# Patient Record
Sex: FEMALE | Race: WHITE | NOT HISPANIC OR LATINO | Employment: OTHER | URBAN - NONMETROPOLITAN AREA
[De-identification: names, ages, dates, MRNs, and addresses within clinical notes are randomized per-mention and may not be internally consistent; named-entity substitution may affect disease eponyms.]

---

## 2017-01-05 ENCOUNTER — COMMUNICATION - GICH (OUTPATIENT)
Dept: INTERNAL MEDICINE | Facility: OTHER | Age: 82
End: 2017-01-05

## 2017-01-05 DIAGNOSIS — F34.1 DYSTHYMIC DISORDER: ICD-10-CM

## 2017-02-14 ENCOUNTER — COMMUNICATION - GICH (OUTPATIENT)
Dept: INTERNAL MEDICINE | Facility: OTHER | Age: 82
End: 2017-02-14

## 2017-02-14 DIAGNOSIS — I10 ESSENTIAL (PRIMARY) HYPERTENSION: ICD-10-CM

## 2017-03-14 ENCOUNTER — COMMUNICATION - GICH (OUTPATIENT)
Dept: INTERNAL MEDICINE | Facility: OTHER | Age: 82
End: 2017-03-14

## 2017-03-14 DIAGNOSIS — M80.88XD OTHER OSTEOPOROSIS WITH CURRENT PATHOLOGICAL FRACTURE, VERTEBRA(E), SUBSEQUENT ENCOUNTER FOR FRACTURE WITH ROUTINE HEALING: ICD-10-CM

## 2017-06-12 ENCOUNTER — COMMUNICATION - GICH (OUTPATIENT)
Dept: INTERNAL MEDICINE | Facility: OTHER | Age: 82
End: 2017-06-12

## 2017-06-12 DIAGNOSIS — M80.88XD OTHER OSTEOPOROSIS WITH CURRENT PATHOLOGICAL FRACTURE, VERTEBRA(E), SUBSEQUENT ENCOUNTER FOR FRACTURE WITH ROUTINE HEALING: ICD-10-CM

## 2017-06-16 ENCOUNTER — COMMUNICATION - GICH (OUTPATIENT)
Dept: INTERNAL MEDICINE | Facility: OTHER | Age: 82
End: 2017-06-16

## 2017-06-16 DIAGNOSIS — M80.88XD OTHER OSTEOPOROSIS WITH CURRENT PATHOLOGICAL FRACTURE, VERTEBRA(E), SUBSEQUENT ENCOUNTER FOR FRACTURE WITH ROUTINE HEALING: ICD-10-CM

## 2017-08-06 ENCOUNTER — COMMUNICATION - GICH (OUTPATIENT)
Dept: INTERNAL MEDICINE | Facility: OTHER | Age: 82
End: 2017-08-06

## 2017-08-06 DIAGNOSIS — I48.20 CHRONIC ATRIAL FIBRILLATION (H): ICD-10-CM

## 2017-08-07 ENCOUNTER — COMMUNICATION - GICH (OUTPATIENT)
Dept: INTERNAL MEDICINE | Facility: OTHER | Age: 82
End: 2017-08-07

## 2017-08-07 DIAGNOSIS — I10 ESSENTIAL (PRIMARY) HYPERTENSION: ICD-10-CM

## 2017-08-08 ENCOUNTER — COMMUNICATION - GICH (OUTPATIENT)
Dept: INTERNAL MEDICINE | Facility: OTHER | Age: 82
End: 2017-08-08

## 2017-08-09 ENCOUNTER — OFFICE VISIT - GICH (OUTPATIENT)
Dept: INTERNAL MEDICINE | Facility: OTHER | Age: 82
End: 2017-08-09

## 2017-08-09 ENCOUNTER — HISTORY (OUTPATIENT)
Dept: INTERNAL MEDICINE | Facility: OTHER | Age: 82
End: 2017-08-09

## 2017-08-09 DIAGNOSIS — Z23 ENCOUNTER FOR IMMUNIZATION: ICD-10-CM

## 2017-08-09 DIAGNOSIS — Z86.19 PERSONAL HISTORY OF OTHER INFECTIOUS AND PARASITIC DISEASES: ICD-10-CM

## 2017-08-09 DIAGNOSIS — I10 ESSENTIAL (PRIMARY) HYPERTENSION: ICD-10-CM

## 2017-08-09 DIAGNOSIS — J18.1 LOBAR PNEUMONIA (H): ICD-10-CM

## 2017-08-09 DIAGNOSIS — Z79.01 LONG TERM CURRENT USE OF ANTICOAGULANT: ICD-10-CM

## 2017-08-09 DIAGNOSIS — Z92.29 PERSONAL HISTORY OF OTHER DRUG THERAPY (CODE): ICD-10-CM

## 2017-08-09 DIAGNOSIS — I50.32 CHRONIC DIASTOLIC HEART FAILURE (H): ICD-10-CM

## 2017-08-09 DIAGNOSIS — I27.29 OTHER SECONDARY PULMONARY HYPERTENSION (H): ICD-10-CM

## 2017-08-09 DIAGNOSIS — I48.20 CHRONIC ATRIAL FIBRILLATION (H): ICD-10-CM

## 2017-08-09 DIAGNOSIS — F01.50 VASCULAR DEMENTIA WITHOUT BEHAVIORAL DISTURBANCE (H): ICD-10-CM

## 2017-08-09 ASSESSMENT — ANXIETY QUESTIONNAIRES
7. FEELING AFRAID AS IF SOMETHING AWFUL MIGHT HAPPEN: NOT AT ALL
4. TROUBLE RELAXING: NOT AT ALL
1. FEELING NERVOUS, ANXIOUS, OR ON EDGE: NOT AT ALL
GAD7 TOTAL SCORE: 0
2. NOT BEING ABLE TO STOP OR CONTROL WORRYING: NOT AT ALL
3. WORRYING TOO MUCH ABOUT DIFFERENT THINGS: NOT AT ALL
6. BECOMING EASILY ANNOYED OR IRRITABLE: NOT AT ALL
5. BEING SO RESTLESS THAT IT IS HARD TO SIT STILL: NOT AT ALL

## 2017-08-09 ASSESSMENT — PATIENT HEALTH QUESTIONNAIRE - PHQ9: SUM OF ALL RESPONSES TO PHQ QUESTIONS 1-9: 0

## 2017-10-03 ENCOUNTER — AMBULATORY - GICH (OUTPATIENT)
Dept: FAMILY MEDICINE | Facility: OTHER | Age: 82
End: 2017-10-03

## 2017-10-03 DIAGNOSIS — Z23 ENCOUNTER FOR IMMUNIZATION: ICD-10-CM

## 2017-12-28 NOTE — PROGRESS NOTES
Patient Information     Patient Name MRN Anne Vázquez [id number] Female 10/31/1929      Progress Notes by Adam Villagran MD at 2017 10:00 AM     Author:  Adam Villagran MD Service:  (none) Author Type:  Physician     Filed:  8/10/2017 11:07 PM Encounter Date:  2017 Status:  Signed     :  Adam Villagran MD (Physician)            Nursing Notes:   Yashira Handy  2017 10:43 AM  Signed  Patient presents to the clinic for medication management.    Yashira Handy LPN        2017 10:00 AM    Anne Matos presents to clinic today for:   Chief Complaint    Patient presents with      Medication Management     HPI: Ms. Matos is a 87 y.o. female who presents today for evaluation of above.     (I10) HYPERTENSION  (primary encounter diagnosis)  (Z23) Need for 23-polyvalent pneumococcal polysaccharide vaccine  (F01.50) Vascular dementia without behavioral disturbance  (I50.32) Chronic diastolic heart failure - Moderate - 2012 ECHO - EF 75%  (I27.2) Pulmonary hypertension, moderate to severe - ECHO 2012  (J18.1) Pneumonia of left lower lobe due to infectious organism (HC)  (I48.2) Chronic atrial fibrillation (HC)  (Z79.01) Chronic anticoagulation - Apixaban  (I10) Essential hypertension  (Z86.19) History of shingles  (Z92.29) Personal history of ongoing treatment with alendronate (Fosamax) -- Completed treatment in 2017     Hypertension, currently well controlled.  Tolerating medications.  Needs refills.  Declines labs today.    Pneumococcal vaccination due today.    Vascular dementia, she is brought in by her .  Behaviors have been well-controlled.  He reports no significant new concerning issues other than that  + got shingles - mild case this winter. Doing well from that.     Pulmonary hypertension, previously noted to be moderately severe.  Has been stable.  Breathing is reported to be doing well.    Atrial fibrillation, rate controlled with diltiazem.   She is anticoagulated with apixaban.  She's been tolerating this well.  Needs refills today.    Fosamax therapy,  thinks it has been about 5 years since she started Fosamax.  She has diagnosis of osteoporosis.  Advised that she stopped Fosamax once she runs out of medications in the next couple months.    Ms. Matos's Body mass index is 26.71 kg/(m^2). This is out of the normal range for a 87 y.o. Normal range for ages 18+ is between 18.5 and 24.9. To lose weight we reviewed risks and benefits of appropriate options such as diet, exercise, and medications. Patient's strategy will be  self-directed nutrition plan and self-directed exercise program   BP Readings from Last 1 Encounters:08/09/17 : 122/78  Ms. Hinson blood pressure is out of the normal range for adults. Per JNC-8 guidelines normal adult blood pressure is < 120/80, pre-hypertensive is between 120/80 and 139/89, and hypertension is 140/90 or greater. Risks of hypertension were discussed. Patient's strategy will be weight loss, increased activity and reduced salt intake    Functional Capacity: > 4 METS.   Reports that she can climb a flight of stairs without any chest pain/heaviness or shortness of breath.   Patient reports no current symptoms of fevers, chills, nausea/vomiting.   No cough. No shortness of breath.   No change in bowel/bladder habits. No melena, hematochezia. No Hematuria.   No rashes. No palpitations.  No orthopnea/paroxysmal nocturnal dyspnea   No vision or hearing issues.   No significant mood issues   + Memory problems - are chronic.   No bruising.     MERLYN:  MERLYN-7 ANXIETY SCREENING 6/30/2016 8/9/2017   MERLYN date (doc flow) 6/30/2016 8/9/2017   Nervous, anxious 0 0   Cannot stop worrying 0 0   Worry about different things 0 0   Cannot relax 0 0   Feeling restless 0 0   Easily annoyed/irritated 0 0   Afraid of awful event 0 0   Score 0 0   Severity none none   Some recent data might be hidden         PHQ9:  PHQ Depression  Screening 9/27/2016 8/9/2017   Date of PHQ exam (doc flow) 9/27/2016 8/9/2017   1. Lack of interest/pleasure 0 - Not at all 0 - Not at all   2. Feeling down/depressed 0 - Not at all 0 - Not at all   PHQ-2 TOTAL SCORE 0 0   3. Trouble sleeping 0 - Not at all 0 - Not at all   4. Decreased energy 0 - Not at all 0 - Not at all   5. Appetite change 0 - Not at all 0 - Not at all   6. Feelings of failure 0 - Not at all 0 - Not at all   7. Trouble concentrating 0 - Not at all 0 - Not at all   8. Activity level 0 - Not at all 0 - Not at all   9. Hurting yourself 0 - Not at all 0 - Not at all   PHQ-9 TOTAL SCORE 0 0   PHQ-9 Severity Level none none   Functional Impairment not difficult at all not difficult at all   Some recent data might be hidden          I have personally reviewed the past medical history, past surgical history, medications, allergies, family and social history as listed below, on 8/9/2017.    Patient Active Problem List       Diagnosis  Date Noted     Personal history of ongoing treatment with alendronate (Fosamax) -- Completed treatment in Fall 2017 08/09/2017     History of shingles  08/09/2017     Chronic anticoagulation - Apixaban  06/22/2016     History of peptic ulcer  06/22/2016     Pneumonia of left lower lobe due to infectious organism (HC)  06/11/2016     Neck pain  06/11/2016     Hyponatremia, POA  06/11/2016     Hypomagnesemia, POA  06/11/2016     DDD (degenerative disc disease), cervical  06/11/2016     Osteoporosis, POA  06/11/2016     Chronic atrial fibrillation (HC)  10/16/2015     Chronic diastolic heart failure - Moderate - 7/19/2012 ECHO - EF 75%  10/03/2014     7/19/2012 ECHO Final Impression:   1. Normal left ventricular size with hyperdynamic systolic function. Estimated ejection fraction 75%.   2. Moderate concentric increase in left ventricular wall thickness.   3. Mild to moderate biatrial enlargement.   4. Sclerotic aortic valve with mild aortic insufficiency.   5. Mitral annulus  calcification with trace to mild mitral regurgitation.   6. Mild tricuspid regurgitation.   7. Moderate pulmonary hypertension with estimated pulmonary artery systolic pressure 48 mmHg plus right atrial pressure.   8. Probable moderate left ventricular diastolic dysfunction.   9. Compared to a previous transthoracic echocardiogram report from 08/18/2010, there appears to be minimal change.        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       Hypertension      Osteoporosis      Stress response     Stress-related symptoms/family discord      Past Surgical History:      Procedure  Laterality Date     APPENDECTOMY  04/70     CARDIOVERSION  3/2012    of fibrillation in [place]       CATARACT REMOVAL  06/11, 07/11    bilateral       COLONOSCOPY SCREENING  09/13/04     CRYOTHERAPY OF CERVIX  02/26/70    Cervical conization       CRYOTHERAPY OF CERVIX  03/08/70    Cervical cauterization       HEMORRHOIDECTOMY  1960     TOTAL ABDOMINAL HYSTERECTOMY  04/70     Current Outpatient Prescriptions       Medication  Sig Dispense Refill     apixaban (ELIQUIS) 5 mg tablet Take 1 tablet by mouth 2 times daily. 180 tablet 3     Calcium-Cholecalciferol, D3, (CALCIUM 600 WITH VITAMIN D3) 600 mg(1,500mg) -400 unit cap Take  by mouth once daily.  0     cyanocobalamin (VITAMIN B-12) 1,000 mcg tablet Take 1 tablet by mouth once daily.  0     diltiazem CD (CARDIZEM CD) 120 mg extended release 24 hr capsule Take 1  capsule by mouth once daily. 90 capsule 3     lisinopril (PRINIVIL; ZESTRIL) 5 mg tablet Take 1 tablet by mouth once daily. 90 tablet 3     multivitamin (MVI) tablet Take 1 tablet by mouth once daily.  0     No Known Allergies  Family History       Problem   Relation Age of Onset     Cancer  Father      liver       Stroke  Mother      Cancer  Sister      pancreatic       Cancer  Brother      lung       Stroke  Other      stroke       Other  Brother       in the war       Other  Daughter      MS       Other  Other      10 total, 1 still alive       Family Status     Relation  Status     Mother  at age 74    stroke      Father  at age 62    liver cancer      Brother     lung cancer      Sister     pancreatic cancer      Brother      in the war      Other     sibling  of stroke      Son     head and spine injury,  in [place]      Daughter  at age 40    step daughter, thyroid cancer      Daughter Alive    MS, dependent      Other Alive    sibling living      Social History     Social History        Marital status:       Spouse name: N/A     Number of children:  N/A     Years of education:  N/A     Social History Main Topics          Smoking status:   Former Smoker      Packs/day:  0.50      Years:  20.00      Types:  Cigarettes      Quit date:  1981      Smokeless tobacco:   Never Used      Alcohol use   0.0 oz/week     0 Standard drinks or equivalent per week        Comment: 3-4 times a week drinks 3 beers       Drug use:   No      Sexual activity:   Not on file      Other Topics  Concern     Not on file      Social History Narrative     , live in [facility] in [place].         Complete ROS was performed and was negative unless otherwise noted in HPI above.  --  is with and provides much of the history today.     EXAM:   Vitals:     17 1003   BP: 122/78   Pulse: 96   Weight: 64.1 kg (141 lb 6 oz)  "  Height: 1.549 m (5' 1\")     BP Readings from Last 3 Encounters:    08/09/17 122/78   09/27/16 134/76   09/23/16 120/76     Wt Readings from Last 3 Encounters:    08/09/17 64.1 kg (141 lb 6 oz)   09/27/16 63.6 kg (140 lb 4 oz)   09/23/16 63.6 kg (140 lb 3.2 oz)     Estimated body mass index is 26.71 kg/(m^2) as calculated from the following:    Height as of this encounter: 1.549 m (5' 1\").    Weight as of this encounter: 64.1 kg (141 lb 6 oz).     EXAM:  Constitutional: Pleasant, alert, appropriate appearance for age. No acute distress  ENT: Normocephalic, Atraumatic, Thyroid without nodules or tenderness   Nose/Mouth: Oral pharynx without erythema or exudates, Nose is patent bilaterally, no rhinorrhea and Dental hygeine adequate   Eyes:  Extraocular muscles intact, Sclera non-icteric, Conjunctiva without erythema  Lymphatic Exam: Non-palpable nodes in neck, clavicular regions  Pulmonary: Lungs are clear to auscultation bilaterally, without wheezes or crackles  Cardiovascular Exam: regular rate and rhythm, brisk carotid upstroke without bruits, no pedal edema  Gastrointestinal Exam: Soft, non-tender, non-distended, positive bowel sounds  Integument: No abnormal rashes, sores, or ulcerations noted  Neurologic Exam: CN 3-12 grossly intact   Musculoskeletal Exam: Moves upper and lower extremities symmetrically, No focal weakness  Gait and station appear grossly normal  Psychiatric Exam: Awake and Alert, Affect and mood appropriate  Speech is fluent, Thought process is normal    INVESTIGATIONS:  Results for orders placed or performed in visit on 06/23/16      H PYLORI ANTIGEN,STOOL      Result  Value Ref Range    H.PYLORI AGN STOOL Negative Negative   CLOSTRIDIUM DIFFICILE TOXIN PCR      Result  Value Ref Range    TOXIGENIC CLOSTRIDIUM DIFFICILE PCR AFF Negative Negative    PRESUMPTIVE NAP1 STRAIN AFF Negative Negative       ASSESSMENT AND PLAN:  Anne was seen today for medication management.    Diagnoses and all " orders for this visit:    HYPERTENSION  -     lisinopril (PRINIVIL; ZESTRIL) 5 mg tablet; Take 1 tablet by mouth once daily.    Need for 23-polyvalent pneumococcal polysaccharide vaccine  -     OMNI PNEUMOCOCCAL VACCINE 23-VALENT => 3 YO IM  -     DE ADMIN PNEUMOCOCCAL VACC (MEDICARE)    Vascular dementia without behavioral disturbance    Chronic diastolic heart failure - Moderate - 7/19/2012 ECHO - EF 75%    Pulmonary hypertension, moderate to severe - ECHO 7/19/2012    Pneumonia of left lower lobe due to infectious organism (HC)    Chronic atrial fibrillation (HC)  -     apixaban (ELIQUIS) 5 mg tablet; Take 1 tablet by mouth 2 times daily.  -     diltiazem CD (CARDIZEM CD) 120 mg extended release 24 hr capsule; Take 1 capsule by mouth once daily.    Chronic anticoagulation - Apixaban    Essential hypertension  -     lisinopril (PRINIVIL; ZESTRIL) 5 mg tablet; Take 1 tablet by mouth once daily.    History of shingles    Personal history of ongoing treatment with alendronate (Fosamax) -- Completed treatment in Fall 2017    lab results and schedule of future lab studies reviewed with patient, reviewed diet, exercise and weight control, recommended sodium restriction, cardiovascular risk and specific lipid/LDL goals reviewed    -- Expected clinical course discussed   -- Medications and their side effects discussed    The ASCVD Risk score (Claude DC Jr, et al., 2013) failed to calculate for the following reasons:    The 2013 ASCVD risk score is only valid for ages 40 to 79    The patient has a prior MCI or stroke diagnosis    Return in about 1 year (around 8/9/2018) for -- annual follow-up.    Patient Instructions     Immunization History     Administered  Date(s) Administered     Influenza Virus, Unspecified 11/09/2006, 10/19/2007, 09/16/2009, 10/27/2010, 09/04/2012, 11/01/2013     Influenza, High-dose Inactivated 10/02/2014     Influenza, IIV3 (Age >=3 years) 11/02/2008     Influenza, IIV4 (Age >= 3 Years) 09/27/2016      Pneumococcal conj 13-Valent (Prevnar 13) 09/27/2016     Tdap 08/06/2013        Pneumococcal PCV 23 shot today.   Get shingles shot if you would like -- from the pharmacy.       Pneumococcal Pneumonia vaccines (PCV 13 and PCV 23)     Pneumococcal Conjugate 13 - Valent Vaccine (One time only).     Pneumococcal 23 - Valent Vaccine -- Two doses (One before age 65 and One After)    -- repeat every 5 years in certain patient populations.     PCV 13 should be given prior to PCV 23 -- THEN -- In eight weeks or more, PCV 23 can be given.   If the patient has already received PCV 23, they should not receive PCV 13 for one year.      Medications refilled.     Finish up your Fosamax -- then stop, you have completed treatment (maximum use of 3 to 5 years).     Return in approximately 1 year, or sooner as needed for follow-up with Dr. Villagran.  -- annual follow-up    Clinic : [phone]  Appointment line: [phone]      Adam Villagran MD

## 2017-12-28 NOTE — TELEPHONE ENCOUNTER
Patient Information     Patient Name MRN Anne Vázquez [id number] Female 10/31/1929      Telephone Encounter by Melissa Hairston RN at 2017  8:56 AM     Author:  Melissa Hairston RN Service:  (none) Author Type:  NURS- Registered Nurse     Filed:  2017  8:59 AM Encounter Date:  2017 Status:  Signed     :  Melissa Hairston RN (NURS- Registered Nurse)            Medication is not on refill protocol. Unable to complete prescription refill per RN Medication Refill Policy.................... MELISSA HAIRSTON RN ....................  2017   8:57 AM        This is a Refill request from: [facility]  Name of Medication: Eliquis 5mg  Quantity requested: 180  Last fill date: 17  Last visit with YADIEL VILLAGRAN was on: 2016 in [facility]  PCP:  Yadiel Villagran MD  Controlled Substance Agreement:  na   Diagnosis r/t this medication request: atrial fibrillation

## 2017-12-28 NOTE — TELEPHONE ENCOUNTER
Patient Information     Patient Name MRN Anne Vázquez [id number] Female 10/31/1929      Telephone Encounter by Melissa Hairston RN at 2017 10:32 AM     Author:  Melissa Hairston RN Service:  (none) Author Type:  NURS- Registered Nurse     Filed:  2017 10:34 AM Encounter Date:  2017 Status:  Signed     :  Melissa Hairston RN (NURS- Registered Nurse)            Ace Inhibitors    Office visit in the past 12 months or per provider note.    Last visit with YADIEL GODWIN was on: 2016 in [facility]  Next visit with YADIEL GODWIN is on: No future appointment listed with this provider  Next visit with Internal Medicine is on: No future appointment listed in this department    Lab test requirements:  Creatinine and Potassium annually, if ordering lab, order BMP.  CREATININE (mg/dL)    Date Value   2016 0.86     POTASSIUM (mmol/L)    Date Value   2016 4.6     Patient is due for labs.  Max refill for 12 months from last office visit or per provider note  Prescription refilled per RN Medication Refill Policy.................... MELISSA HAIRSTON RN ....................  2017   10:33 AM

## 2017-12-28 NOTE — TELEPHONE ENCOUNTER
Patient Information     Patient Name MRAnne Grullon [id number] Female 10/31/1929      Telephone Encounter by Janina Marcum RN at 2017  8:19 AM     Author:  Janina Marcum RN Service:  (none) Author Type:  NURS- Registered Nurse     Filed:  2017  8:22 AM Encounter Date:  2017 Status:  Signed     :  Janina Marcum RN (NURS- Registered Nurse)            Refilled on 17 #12 x 0 refills to [facility].    Unable to complete prescription refill per RN Medication Refill Policy.................... JANINA MARCUM RN ....................  2017   8:19 AM

## 2017-12-28 NOTE — TELEPHONE ENCOUNTER
Patient Information     Patient Name MRN Anne Vázquez [id number] Female 10/31/1929      Telephone Encounter by Melissa Hairston RN at 2017  2:19 PM     Author:  Melissa Hairston RN Service:  (none) Author Type:  NURS- Registered Nurse     Filed:  2017  2:20 PM Encounter Date:  2017 Status:  Signed     :  Melissa Hairston RN (NURS- Registered Nurse)            Osteoporosis    Office visit in the past 12 months or per provider note.    Last visit with YADIEL GODWIN was on: 2016 in [facility]  Next visit with YADIEL GODWIN is on: No future appointment listed with this provider  Next visit with Internal Medicine is on: No future appointment listed in this department    No limitation on refills for calcium and calcium with D.  Max refill for 12 months from last office visit or per provider note.  Prescription refilled per RN Medication Refill Policy.................... MELISSA HAIRSTON RN ....................  2017   2:19 PM

## 2017-12-28 NOTE — PATIENT INSTRUCTIONS
Patient Information     Patient Name MRN Anne Vázquez [id number] Female 10/31/1929      Patient Instructions by Adam Villagran MD at 2017 10:00 AM     Author:  Adam Villagran MD  Service:  (none) Author Type:  Physician     Filed:  2017 10:49 AM  Encounter Date:  2017 Status:  Addendum     :  Adam Villagran MD (Physician)        Related Notes: Original Note by Adam Villagran MD (Physician) filed at 2017 10:44 AM            Immunization History     Administered  Date(s) Administered     Influenza Virus, Unspecified 2006, 10/19/2007, 2009, 10/27/2010, 2012, 2013     Influenza, High-dose Inactivated 10/02/2014     Influenza, IIV3 (Age >=3 years) 2008     Influenza, IIV4 (Age >= 3 Years) 2016     Pneumococcal conj 13-Valent (Prevnar 13) 2016     Tdap 2013        Pneumococcal PCV 23 shot today.   Get shingles shot if you would like -- from the pharmacy.       Pneumococcal Pneumonia vaccines (PCV 13 and PCV 23)     Pneumococcal Conjugate 13 - Valent Vaccine (One time only).     Pneumococcal 23 - Valent Vaccine -- Two doses (One before age 65 and One After)    -- repeat every 5 years in certain patient populations.     PCV 13 should be given prior to PCV 23 -- THEN -- In eight weeks or more, PCV 23 can be given.   If the patient has already received PCV 23, they should not receive PCV 13 for one year.      Medications refilled.     Finish up your Fosamax -- then stop, you have completed treatment (maximum use of 3 to 5 years).     Return in approximately 1 year, or sooner as needed for follow-up with Dr. Villagran.  -- annual follow-up    Clinic : [phone]  Appointment line: [phone]

## 2017-12-28 NOTE — TELEPHONE ENCOUNTER
Patient Information     Patient Name MRAnne Grullon [id number] Female 10/31/1929      Telephone Encounter by Heaven Alvarez LPN at 2017  7:40 AM     Author:  Heaven Alvarez LPN Service:  (none) Author Type:  NURS- Licensed Practical Nurse     Filed:  2017  7:41 AM Encounter Date:  2017 Status:  Signed     :  Heaven Alvarez LPN (NURS- Licensed Practical Nurse)            Will refill medications today  at her appointment.  Heaven Alvarez LPN............................ 2017 7:41 AM

## 2017-12-30 NOTE — NURSING NOTE
Patient Information     Patient Name MRN Anne Vázquez [id number] Female 10/31/1929      Nursing Note by Yashira Handy at 2017 10:00 AM     Author:  Yashira Handy Service:  (none) Author Type:  (none)     Filed:  2017 10:43 AM Encounter Date:  2017 Status:  Signed     :  Yashira Handy            Patient presents to the clinic for medication management.    Yashira Handy LPN        2017 10:00 AM

## 2018-01-02 NOTE — TELEPHONE ENCOUNTER
Patient Information     Patient Name MRN Anne Vázquez [id number] Female 10/31/1929      Telephone Encounter by Melissa Hairston RN at 2017 11:35 AM     Author:  Melissa Hairston RN Service:  (none) Author Type:  NURS- Registered Nurse     Filed:  2017 11:37 AM Encounter Date:  2017 Status:  Signed     :  Melissa Hairston RN (NURS- Registered Nurse)            Depression-in adults 18 and over  SSRI    Office visit in the past 12 months or as indicated in chart.  Should have clinic visit 1-2 months after initial prescription.    Last visit with YADIEL GODWIN was on: 2016 in [facility]  Next visit with YADIEL GODWIN is on: No future appointment listed with this provider  Next visit with Internal Medicine is on: No future appointment listed in this department    Max refills 12 months from last office visit or per providers notes.  Prescription refilled per RN Medication Refill Policy.................... MELISSA HAIRSTON RN ....................  2017   11:35 AM

## 2018-01-03 NOTE — TELEPHONE ENCOUNTER
Patient Information     Patient Name MRN Anne Vázquez [id number] Female 10/31/1929      Telephone Encounter by Janina Babb RN at 2017 11:16 AM     Author:  Janina Babb RN Service:  (none) Author Type:  NURS- Registered Nurse     Filed:  2017 11:18 AM Encounter Date:  2017 Status:  Signed     :  Janina Babb RN (NURS- Registered Nurse)            Ace Inhibitors    Office visit in the past 12 months or per provider note.    Last visit with YADIEL GODWIN was on: 2016 in [facility]  Next visit with YADIEL GODWIN is on: No future appointment listed with this provider  Next visit with Internal Medicine is on: No future appointment listed in this department    Lab test requirements:  Creatinine and Potassium annually, if ordering lab, order BMP.  CREATININE (mg/dL)    Date Value   2016 0.86     POTASSIUM (mmol/L)    Date Value   2016 4.6       Max refill for 12 months from last office visit or per provider note  Prescription refilled per RN Medication Refill Policy.................... JANINA BABB RN ....................  2017   11:17 AM

## 2018-01-03 NOTE — TELEPHONE ENCOUNTER
Patient Information     Patient Name MRN Anne Vázquez [id number] Female 10/31/1929      Telephone Encounter by Janina Babb RN at 3/14/2017 11:50 AM     Author:  Janina Babb RN Service:  (none) Author Type:  NURS- Registered Nurse     Filed:  3/14/2017 11:57 AM Encounter Date:  3/14/2017 Status:  Signed     :  Janina Babb RN (NURS- Registered Nurse)            Osteoporosis    Office visit in the past 12 months or per provider note.    Last visit with YADIEL GODWIN was on: 2016 in [facility]  Next visit with YADIEL GODWIN is on: No future appointment listed with this provider  Next visit with Internal Medicine is on: No future appointment listed in this department    No limitation on refills for calcium and calcium with D.  Max refill for 12 months from last office visit or per provider note.    Prescription refilled per RN Medication Refill Policy.................... JANINA BABB RN ....................  3/14/2017   11:56 AM

## 2018-01-19 PROBLEM — I10 HYPERTENSION: Status: ACTIVE | Noted: 2018-01-19

## 2018-01-19 PROBLEM — F34.1 DYSTHYMIC DISORDER: Status: ACTIVE | Noted: 2018-01-19

## 2018-01-19 PROBLEM — E78.5 HYPERLIPIDEMIA: Status: ACTIVE | Noted: 2018-01-19

## 2018-01-19 PROBLEM — Z79.83: Status: ACTIVE | Noted: 2017-08-09

## 2018-01-19 PROBLEM — Z86.19 HISTORY OF SHINGLES: Status: ACTIVE | Noted: 2017-08-09

## 2018-01-19 RX ORDER — LISINOPRIL 5 MG/1
TABLET ORAL
COMMUNITY
Start: 2017-08-09 | End: 2018-09-05

## 2018-01-19 RX ORDER — DILTIAZEM HYDROCHLORIDE 120 MG/1
CAPSULE, COATED, EXTENDED RELEASE ORAL
COMMUNITY
Start: 2017-08-09 | End: 2018-09-05

## 2018-01-19 RX ORDER — DIPHENOXYLATE HYDROCHLORIDE AND ATROPINE SULFATE 2.5; .025 MG/1; MG/1
TABLET ORAL
COMMUNITY
Start: 2014-10-02

## 2018-01-27 VITALS
HEART RATE: 96 BPM | HEIGHT: 61 IN | DIASTOLIC BLOOD PRESSURE: 78 MMHG | SYSTOLIC BLOOD PRESSURE: 122 MMHG | BODY MASS INDEX: 26.69 KG/M2 | WEIGHT: 141.38 LBS

## 2018-01-31 ASSESSMENT — ANXIETY QUESTIONNAIRES: GAD7 TOTAL SCORE: 0

## 2018-01-31 ASSESSMENT — PATIENT HEALTH QUESTIONNAIRE - PHQ9: SUM OF ALL RESPONSES TO PHQ QUESTIONS 1-9: 0

## 2018-09-05 DIAGNOSIS — I48.20 CHRONIC ATRIAL FIBRILLATION (H): ICD-10-CM

## 2018-09-05 DIAGNOSIS — I10 ESSENTIAL HYPERTENSION: Primary | ICD-10-CM

## 2018-09-05 NOTE — LETTER
September 7, 2018      Annejoseph Awadridon  [address] [facility] [address]        A refill request was received from your pharmacy for Cartia and Lisinopril.    Additional refills require an office visit with Dr. Villagran for annual review and labs.    Please call [phone] to schedule appointment.      Sincerely,    Refill Nurse

## 2018-09-07 RX ORDER — LISINOPRIL 5 MG/1
TABLET ORAL
Qty: 90 TABLET | Refills: 3 | Status: SHIPPED | OUTPATIENT
Start: 2018-09-07 | End: 2018-10-16

## 2018-09-07 RX ORDER — DILTIAZEM HYDROCHLORIDE 120 MG/1
CAPSULE, EXTENDED RELEASE ORAL
Qty: 90 CAPSULE | Refills: 3 | Status: SHIPPED | OUTPATIENT
Start: 2018-09-07 | End: 2018-10-16

## 2018-09-07 NOTE — TELEPHONE ENCOUNTER
Routing refill request to provider for review/approval because:  Labs not current:  Creat potassium, last labs 2016  Patient needs to be seen because it has been more than 1 year since last office visit.  Patient is due for annual review and labs  Letter sent  LOV: 8/9/17    Janina Marcum RN on 9/7/2018 at 11:13 AM

## 2018-09-12 DIAGNOSIS — I48.20 CHRONIC ATRIAL FIBRILLATION (H): Primary | ICD-10-CM

## 2018-09-14 RX ORDER — APIXABAN 5 MG/1
TABLET, FILM COATED ORAL
Qty: 180 TABLET | Refills: 3 | Status: SHIPPED | OUTPATIENT
Start: 2018-09-14 | End: 2018-10-16

## 2018-10-16 ENCOUNTER — OFFICE VISIT (OUTPATIENT)
Dept: INTERNAL MEDICINE | Facility: OTHER | Age: 83
End: 2018-10-16
Attending: INTERNAL MEDICINE
Payer: COMMERCIAL

## 2018-10-16 VITALS
DIASTOLIC BLOOD PRESSURE: 78 MMHG | SYSTOLIC BLOOD PRESSURE: 138 MMHG | BODY MASS INDEX: 28.2 KG/M2 | WEIGHT: 149.25 LBS | HEART RATE: 72 BPM

## 2018-10-16 DIAGNOSIS — I48.20 CHRONIC ATRIAL FIBRILLATION (H): ICD-10-CM

## 2018-10-16 DIAGNOSIS — I10 BENIGN ESSENTIAL HYPERTENSION: ICD-10-CM

## 2018-10-16 DIAGNOSIS — I27.20 PULMONARY HYPERTENSION, MODERATE TO SEVERE (H): ICD-10-CM

## 2018-10-16 DIAGNOSIS — I50.32 CHRONIC DIASTOLIC HEART FAILURE (H): Primary | ICD-10-CM

## 2018-10-16 DIAGNOSIS — F01.50 VASCULAR DEMENTIA WITHOUT BEHAVIORAL DISTURBANCE (H): ICD-10-CM

## 2018-10-16 DIAGNOSIS — Z79.01 CHRONIC ANTICOAGULATION: ICD-10-CM

## 2018-10-16 DIAGNOSIS — Z23 NEED FOR PROPHYLACTIC VACCINATION AND INOCULATION AGAINST INFLUENZA: ICD-10-CM

## 2018-10-16 DIAGNOSIS — E78.2 MIXED HYPERLIPIDEMIA: ICD-10-CM

## 2018-10-16 PROCEDURE — 90471 IMMUNIZATION ADMIN: CPT | Performed by: INTERNAL MEDICINE

## 2018-10-16 PROCEDURE — 90662 IIV NO PRSV INCREASED AG IM: CPT | Performed by: INTERNAL MEDICINE

## 2018-10-16 PROCEDURE — 99214 OFFICE O/P EST MOD 30 MIN: CPT | Mod: 25 | Performed by: INTERNAL MEDICINE

## 2018-10-16 RX ORDER — DILTIAZEM HYDROCHLORIDE 120 MG/1
CAPSULE, COATED, EXTENDED RELEASE ORAL
Qty: 90 CAPSULE | Refills: 3 | Status: SHIPPED | OUTPATIENT
Start: 2018-10-16 | End: 2019-08-20

## 2018-10-16 RX ORDER — LISINOPRIL 5 MG/1
5 TABLET ORAL DAILY
Qty: 90 TABLET | Refills: 3 | Status: SHIPPED | OUTPATIENT
Start: 2018-10-16 | End: 2019-08-20

## 2018-10-16 ASSESSMENT — ENCOUNTER SYMPTOMS
COUGH: 0
DIARRHEA: 0
PALPITATIONS: 0
HEMATURIA: 0
CONFUSION: 1
LIGHT-HEADEDNESS: 0
CHILLS: 0
MYALGIAS: 0
DYSURIA: 0
ARTHRALGIAS: 0
DIZZINESS: 0
AGITATION: 0
ABDOMINAL PAIN: 0
VOMITING: 0
WHEEZING: 0
BRUISES/BLEEDS EASILY: 0
NAUSEA: 0
FATIGUE: 0
SHORTNESS OF BREATH: 0
FEVER: 0
EYE PAIN: 0

## 2018-10-16 ASSESSMENT — PAIN SCALES - GENERAL: PAINLEVEL: NO PAIN (0)

## 2018-10-16 ASSESSMENT — ANXIETY QUESTIONNAIRES
IF YOU CHECKED OFF ANY PROBLEMS ON THIS QUESTIONNAIRE, HOW DIFFICULT HAVE THESE PROBLEMS MADE IT FOR YOU TO DO YOUR WORK, TAKE CARE OF THINGS AT HOME, OR GET ALONG WITH OTHER PEOPLE: NOT DIFFICULT AT ALL
6. BECOMING EASILY ANNOYED OR IRRITABLE: NOT AT ALL
2. NOT BEING ABLE TO STOP OR CONTROL WORRYING: NOT AT ALL
1. FEELING NERVOUS, ANXIOUS, OR ON EDGE: NOT AT ALL
5. BEING SO RESTLESS THAT IT IS HARD TO SIT STILL: NOT AT ALL
3. WORRYING TOO MUCH ABOUT DIFFERENT THINGS: NOT AT ALL
7. FEELING AFRAID AS IF SOMETHING AWFUL MIGHT HAPPEN: NOT AT ALL
GAD7 TOTAL SCORE: 0

## 2018-10-16 ASSESSMENT — PATIENT HEALTH QUESTIONNAIRE - PHQ9: 5. POOR APPETITE OR OVEREATING: NOT AT ALL

## 2018-10-16 NOTE — PROGRESS NOTES
Nursing Notes:   Yashira Handy LPN  10/16/2018 10:42 AM  Signed  Patient presents to the clinic for medication management, prescriptions up to date.      Yashira Handy LPN 10/16/2018 10:31 AM      Nursing note reviewed with patient.  Accurracy and completeness verified.   Ms. Matos is a 88 year old female who:  Patient presents with:  Recheck Medication      ICD-10-CM    1. Chronic diastolic heart failure (H) I50.32    2. Need for prophylactic vaccination and inoculation against influenza Z23 HC FLU VACCINE, INCREASED ANTIGEN, PRESV FREE   3. Chronic atrial fibrillation (H) I48.2 apixaban ANTICOAGULANT (ELIQUIS) 5 MG tablet     diltiazem (CARTIA XT) 120 MG 24 hr capsule   4. Chronic anticoagulation Z79.01    5. Mixed hyperlipidemia E78.2    6. Benign essential hypertension I10 lisinopril (PRINIVIL/ZESTRIL) 5 MG tablet   7. Pulmonary hypertension, moderate to severe (H) I27.20    8. Vascular dementia without behavioral disturbance F01.50      HPI  Patient presents with  for follow-up of multiple problems.    Diastolic heart failure, has been stable.  Doing well with lisinopril and diltiazem.  Does not need diuretics at this time.  No excessive fluid retention or swelling issues.    Flu shot today.    Chronic atrial fibrillation, heart rates are controlled with diltiazem.  Anticoagulation with apixaban.  Overall doing well.  Tolerating medication.  Refill sent to pharmacy.    Hyperlipidemia, currently diet controlled.    Hypertension, currently well controlled.  Tolerating medication.  Needs refills of lisinopril.    Pulmonary hypertension, appears stable.  Mobility is not a great but not poor.  Does not walk a lot.   states that she breathes shallow breaths and frequent but never really seems like she is laboring for breath.    Vascular dementia, chronic/stable.   provides much of the history today.  Patient is rather pleasant.  She does get quite anxious when her  leaves  however.    Functional Capacity: about 4 METS.   No orthopnea/paroxysmal nocturnal dyspnea  Review of Systems   Unable to perform ROS: Dementia (History is from )   Constitutional: Negative for chills, fatigue and fever.   HENT: Negative for congestion and hearing loss.    Eyes: Negative for pain and visual disturbance.   Respiratory: Negative for cough, shortness of breath and wheezing.    Cardiovascular: Negative for chest pain and palpitations.   Gastrointestinal: Negative for abdominal pain, diarrhea, nausea and vomiting.   Endocrine: Negative for cold intolerance and heat intolerance.   Genitourinary: Negative for dysuria and hematuria.   Musculoskeletal: Negative for arthralgias and myalgias.   Skin: Negative for pallor.   Allergic/Immunologic: Negative for immunocompromised state.   Neurological: Negative for dizziness and light-headedness.   Hematological: Does not bruise/bleed easily.   Psychiatric/Behavioral: Positive for confusion (+ very nervous when  is out of the house). Negative for agitation.      MERLYN:   MERLYN-7 SCORE 6/30/2016 8/9/2017 10/16/2018   Total Score 0 0 0     PHQ9:  PHQ-9 SCORE 9/27/2016 8/9/2017 10/16/2018   Total Score 0 0 0       I have personally reviewed the past medical history, past surgical history, medications, allergies, family and social history as listed below, on 10/16/2018.    No Known Allergies    Current Outpatient Prescriptions   Medication Sig Dispense Refill     apixaban ANTICOAGULANT (ELIQUIS) 5 MG tablet TAKE 1 TABLET BY MOUTH TWICE A  tablet 3     cyanocobalamin (RA VITAMIN B-12 TR) 1000 MCG TBCR Take 1 tablet by mouth once daily.       diltiazem (CARTIA XT) 120 MG 24 hr capsule TAKE 1 CAPSULE BY MOUTH EVERY DAY 90 capsule 3     lisinopril (PRINIVIL/ZESTRIL) 5 MG tablet Take 1 tablet (5 mg) by mouth daily 90 tablet 3     Multiple Vitamin (MULTI-VITAMINS) TABS Take 1 tablet by mouth once daily.       [DISCONTINUED] CARTIA  MG 24 hr capsule  TAKE 1 CAPSULE BY MOUTH EVERY DAY 90 capsule 3     [DISCONTINUED] lisinopril (PRINIVIL/ZESTRIL) 5 MG tablet TAKE 1 TABLET BY MOUTH ONCE DAILY 90 tablet 3        Patient Active Problem List    Diagnosis Date Noted     Dysthymic disorder 01/19/2018     Priority: Medium     Mixed hyperlipidemia 01/19/2018     Priority: Medium     Benign essential hypertension 01/19/2018     Priority: Medium     History of shingles 08/09/2017     Priority: Medium     Personal history of ongoing treatment with alendronate (Fosamax) 08/09/2017     Priority: Medium     Chronic anticoagulation 06/22/2016     Priority: Medium     History of peptic ulcer 06/22/2016     Priority: Medium     DDD (degenerative disc disease), cervical 06/11/2016     Priority: Medium     Hypomagnesemia 06/11/2016     Priority: Medium     Hyponatremia 06/11/2016     Priority: Medium     Neck pain 06/11/2016     Priority: Medium     Osteoporosis 06/11/2016     Priority: Medium     Pneumonia of left lower lobe due to infectious organism (H) 06/11/2016     Priority: Medium     Chronic atrial fibrillation (H) 10/16/2015     Priority: Medium     Biatrial enlargement 10/03/2014     Priority: Medium     Chronic diastolic heart failure (H) 10/03/2014     Priority: Medium     Overview:   7/19/2012 ECHO Final Impression:   1. Normal left ventricular size with hyperdynamic systolic function. Estimated ejection fraction 75%.   2. Moderate concentric increase in left ventricular wall thickness.   3. Mild to moderate biatrial enlargement.   4. Sclerotic aortic valve with mild aortic insufficiency.   5. Mitral annulus calcification with trace to mild mitral regurgitation.   6. Mild tricuspid regurgitation.   7. Moderate pulmonary hypertension with estimated pulmonary artery systolic pressure 48 mmHg plus right atrial pressure.   8. Probable moderate left ventricular diastolic dysfunction.   9. Compared to a previous transthoracic echocardiogram report from 08/18/2010, there  appears to be minimal change.       LVH (left ventricular hypertrophy) 10/03/2014     Priority: Medium     Pulmonary hypertension, moderate to severe (H) 10/03/2014     Priority: Medium     Collapse of lumbar vertebra due to osteoporosis (H) 10/02/2014     Priority: Medium     History of recurrent TIAs 10/02/2014     Priority: Medium     Vascular dementia without behavioral disturbance 10/02/2014     Priority: Medium     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,Billroth II gastrectomy     Past Surgical History:   Procedure Laterality Date     APPENDECTOMY OPEN      04/70     COLONOSCOPY      09/13/04     EXTRACAPSULAR CATARACT EXTRATION WITH INTRAOCULAR LENS IMPLANT      06/11, 07/11,bilateral     HEMORRHOID SURGERY      1960     HYSTERECTOMY TOTAL ABDOMINAL      04/70     OTHER SURGICAL HISTORY      3/2012,207069,CARDIOVERSION,of fibrillation in [place]     OTHER SURGICAL HISTORY      02/26/70,208526,CRYOTHERAPY OF CERVIX,Cervical conization     OTHER SURGICAL HISTORY      03/08/70,208526,CRYOTHERAPY OF CERVIX,Cervical cauterization     Social History     Social History     Marital status:      Spouse name: N/A     Number of children: N/A     Years of education: N/A     Social History Main Topics     Smoking status: Former Smoker     Packs/day: 0.50     Years: 20.00     Types: Cigarettes     Quit date: 1/1/1981     Smokeless tobacco: Never Used     Alcohol use 0.0 oz/week      Comment: Alcoholic Drinks/day: 3-4 times a week drinks 3 beers     Drug use: No      "Sexual activity: No     Other Topics Concern     None     Social History Narrative    ,  Stan.     Lives in [place].  Winter in [place].     Family History   Problem Relation Age of Onset     Other - See Comments Mother      Stroke     Cancer Father      Cancer,liver     Cancer Sister      Cancer,pancreatic     Cancer Brother      Cancer,lung     Other - See Comments Other      Stroke,stroke     Other - See Comments Brother       in the war     Other - See Comments Daughter      MS     Other - See Comments Other      10 total, 1 still alive       EXAM:   Vitals:    10/16/18 1032   BP: 138/78   BP Location: Right arm   Patient Position: Sitting   Cuff Size: Adult Regular   Pulse: 72   Weight: 149 lb 4 oz (67.7 kg)       Current Pain Score: No Pain (0)     BP Readings from Last 3 Encounters:   10/16/18 138/78   17 122/78   16 134/76      Wt Readings from Last 3 Encounters:   10/16/18 149 lb 4 oz (67.7 kg)   17 141 lb 6 oz (64.1 kg)   16 140 lb 4 oz (63.6 kg)      Estimated body mass index is 28.2 kg/(m^2) as calculated from the following:    Height as of 17: 5' 1\" (1.549 m).    Weight as of this encounter: 149 lb 4 oz (67.7 kg).     Physical Exam   Constitutional: She appears well-developed and well-nourished. No distress.   HENT:   Head: Normocephalic and atraumatic.   Eyes: No scleral icterus.   Neck: No tracheal deviation present. No thyromegaly present.   Cardiovascular: Normal rate and regular rhythm.    Pulmonary/Chest: Effort normal.   Abdominal: Soft.   Musculoskeletal: Normal range of motion. She exhibits no tenderness or deformity.   Lymphadenopathy:     She has no cervical adenopathy.   Neurological: She is alert.   + memory loss / dementia noted   Skin: Skin is warm and dry. No rash noted.   Psychiatric: She has a normal mood and affect. Her behavior is normal.      INVESTIGATIONS:  Results for orders placed or performed during the hospital encounter of " 06/28/16   XR Esophagram    Narrative    Exam:XR ESOPHAGUS    History:    Poor appetite    Findings:Air contrast contrast exam was performed. The swallowing mechanism is normal except for incomplete relaxation of the cricopharyngeus muscle. The primary peristaltic wave is slightly diminished. The esophageal caliber is normal except for a   nonobstructing intermittent Schatzki's ring. A small sliding-type intermittent sliding hiatus hernia is noted. A minimal amount of gastroesophageal reflux is seen. There is no    Evidence for esophagitis or esophageal mass.    The patient states that she ate breakfast, however there is a substantial amount of material within the residual stomach. The patient evidently had a prior Billroth II procedure. There is no obstruction to the flow of barium into the small bowel.    Impression:There is an intermittent sliding-type hiatus hernia with a nonobstructing Schatzki's ring. Minimal gastroesophageal reflux is present. There is relatively slow emptying of the esophagus in this patient that has reduction of the primary   peristaltic wave. There is no evidence for esophagitis. The fact that there is residual food in the gastric pouch raises the possibility of reduced gastric emptying.    Electronically Signed By: Micheal Marcum M.D. on 6/28/2016 2:09 PM       ASSESSMENT AND PLAN:  Problem List Items Addressed This Visit        Nervous and Auditory    Vascular dementia without behavioral disturbance       Respiratory    Pulmonary hypertension, moderate to severe (H)       Endocrine    Mixed hyperlipidemia       Circulatory    Chronic atrial fibrillation (H)    Relevant Medications    apixaban ANTICOAGULANT (ELIQUIS) 5 MG tablet    diltiazem (CARTIA XT) 120 MG 24 hr capsule    Chronic diastolic heart failure (H) - Primary    Benign essential hypertension    Relevant Medications    lisinopril (PRINIVIL/ZESTRIL) 5 MG tablet       Other    Chronic anticoagulation      Other Visit Diagnoses      Need for prophylactic vaccination and inoculation against influenza        Relevant Orders    HC FLU VACCINE, INCREASED ANTIGEN, PRESV FREE (Completed)        recommended sodium restriction  -- Expected clinical course discussed    -- Medications and their side effects discussed    25 minutes spent in face-to-face interaction with patient and  (separate from separately billed procedures) with greater than 50% spent in counseling and care coordination of listed medical problems.      Patient Instructions   Blood pressure is well controlled.    Medications refilled.    Flu shot today.    Return in approximately 1 year, or sooner as needed for follow-up with Dr. Villagran.  - Annual follow-up    Clinic : [phone]  Appointment line: [phone]      Adam Villagran MD  Internal Medicine  [facility]

## 2018-10-16 NOTE — NURSING NOTE
Patient presents to the clinic for medication management, prescriptions up to date.      Yashira Handy LPN 10/16/2018 10:31 AM

## 2018-10-16 NOTE — MR AVS SNAPSHOT
After Visit Summary   10/16/2018    Anne Matos    MRN: [id number]           Patient Information     Date Of Birth          10/31/1929        Visit Information        Provider Department      10/16/2018 11:00 AM Adam Villagran MD [facility]        Today's Diagnoses     Chronic diastolic heart failure (H)    -  1    Need for prophylactic vaccination and inoculation against influenza        Chronic atrial fibrillation (H)        Chronic anticoagulation        Mixed hyperlipidemia        Benign essential hypertension        Pulmonary hypertension, moderate to severe (H)        Vascular dementia without behavioral disturbance          Care Instructions    Blood pressure is well controlled.    Medications refilled.    Flu shot today.    Return in approximately 1 year, or sooner as needed for follow-up with Dr. Villagran.  - Annual follow-up    Clinic : [phone]  Appointment line: [phone]            Follow-ups after your visit        Follow-up notes from your care team     Return in about 1 year (around 10/16/2019) for - Annual follow-up.      Your next 10 appointments already scheduled     Oct 16, 2018 11:00 AM CDT   PHYSICAL with Adam Villagran MD   [facility] ([facility])    [address]  [place] [id number] [phone]              Who to contact     If you have questions or need follow up information about today's clinic visit or your schedule please contact [facility] directly at [phone].  Normal or non-critical lab and imaging results will be communicated to you by MyChart, letter or phone within 4 business days after the clinic has received the results. If you do not hear from us within 7 days, please contact the clinic through MyChart or phone. If you have a critical or abnormal lab result, we will notify you by phone as soon as possible.  Submit refill requests through  [facility] or call your pharmacy and they will forward the refill request to us. Please allow 3 business days for your refill to be completed.          Additional Information About Your Visit        Care EveryWhere ID     This is your Care EveryWhere ID. This could be used by other organizations to access your [place] medical records  [id number]        Your Vitals Were     Pulse BMI (Body Mass Index)                72 28.2 kg/m2           Blood Pressure from Last 3 Encounters:   10/16/18 138/78   08/09/17 122/78   09/27/16 134/76    Weight from Last 3 Encounters:   10/16/18 149 lb 4 oz (67.7 kg)   08/09/17 141 lb 6 oz (64.1 kg)   09/27/16 140 lb 4 oz (63.6 kg)              We Performed the Following     HC FLU VACCINE, INCREASED ANTIGEN, PRESV FREE          Today's Medication Changes          These changes are accurate as of 10/16/18 10:48 AM.  If you have any questions, ask your nurse or doctor.               These medicines have changed or have updated prescriptions.        Dose/Directions    lisinopril 5 MG tablet   Commonly known as:  PRINIVIL/ZESTRIL   This may have changed:  See the new instructions.   Used for:  Benign essential hypertension   Changed by:  Adam Villagran MD        Dose:  5 mg   Take 1 tablet (5 mg) by mouth daily   Quantity:  90 tablet   Refills:  3            Where to get your medicines      These medications were sent to [id number] ([facility] - [facility] [place] - [address]  [address], [facility] [address]     Phone:  [phone]     apixaban ANTICOAGULANT 5 MG tablet    diltiazem 120 MG 24 hr capsule    lisinopril 5 MG tablet                Primary Care Provider Office Phone # Fax #    Adam Villagran [phone] [address]  [facility] [address]        Equal Access to Services     HERBER OCHOA AH: Dominique loweryo Somartinali, waaxda luqadaha, qaybta kaalmada adeegyada, waxay olegario hopkins. So  [facility] [phone].    ATENCIÓN: Si [address], tiene a angela disposición servicios gratuitos de asistencia lingüística. Marine valenzuela [phone].    We comply with applicable federal civil rights laws and [place] laws. We do not discriminate on the basis of race, color, national origin, age, disability, sex, sexual orientation, or gender identity.            Thank you!     Thank you for choosing [facility] [place]  for your care. Our goal is always to provide you with excellent care. Hearing back from our patients is one way we can continue to improve our services. Please take a few minutes to complete the written survey that you may receive in the mail after your visit with us. Thank you!             Your Updated Medication List - Protect others around you: Learn how to safely use, store and throw away your medicines at www.disposemymeds.org.          This list is accurate as of 10/16/18 10:48 AM.  Always use your most recent med list.                   Brand Name Dispense Instructions for use Diagnosis    apixaban ANTICOAGULANT 5 MG tablet    ELIQUIS    180 tablet    TAKE 1 TABLET BY MOUTH TWICE A DAY    Chronic atrial fibrillation (H)       diltiazem 120 MG 24 hr capsule    CARTIA XT    90 capsule    TAKE 1 CAPSULE BY MOUTH EVERY DAY    Chronic atrial fibrillation (H)       lisinopril 5 MG tablet    PRINIVIL/ZESTRIL    90 tablet    Take 1 tablet (5 mg) by mouth daily    Benign essential hypertension       MULTI-VITAMINS Tabs      Take 1 tablet by mouth once daily.        RA VITAMIN B-12 TR 1000 MCG Tbcr   Generic drug:  cyanocobalamin      Take 1 tablet by mouth once daily.

## 2018-10-16 NOTE — PROGRESS NOTES

## 2018-10-16 NOTE — PATIENT INSTRUCTIONS
Blood pressure is well controlled.    Medications refilled.    Flu shot today.    Return in approximately 1 year, or sooner as needed for follow-up with Dr. Villagran.  - Annual follow-up    Clinic : [phone]  Appointment line: [phone]

## 2018-10-17 ASSESSMENT — PATIENT HEALTH QUESTIONNAIRE - PHQ9: SUM OF ALL RESPONSES TO PHQ QUESTIONS 1-9: 0

## 2018-10-17 ASSESSMENT — ANXIETY QUESTIONNAIRES: GAD7 TOTAL SCORE: 0

## 2019-04-26 ENCOUNTER — TELEPHONE (OUTPATIENT)
Dept: INTERNAL MEDICINE | Facility: OTHER | Age: 84
End: 2019-04-26

## 2019-04-26 ENCOUNTER — OFFICE VISIT (OUTPATIENT)
Dept: INTERNAL MEDICINE | Facility: OTHER | Age: 84
End: 2019-04-26
Attending: INTERNAL MEDICINE
Payer: COMMERCIAL

## 2019-04-26 ENCOUNTER — HOSPITAL ENCOUNTER (OUTPATIENT)
Dept: GENERAL RADIOLOGY | Facility: OTHER | Age: 84
Discharge: HOME | End: 2019-04-26
Attending: INTERNAL MEDICINE | Admitting: INTERNAL MEDICINE
Payer: COMMERCIAL

## 2019-04-26 VITALS
TEMPERATURE: 97.4 F | BODY MASS INDEX: 28.15 KG/M2 | RESPIRATION RATE: 20 BRPM | SYSTOLIC BLOOD PRESSURE: 130 MMHG | DIASTOLIC BLOOD PRESSURE: 74 MMHG | HEART RATE: 84 BPM | OXYGEN SATURATION: 95 % | WEIGHT: 149 LBS

## 2019-04-26 DIAGNOSIS — R53.83 MALAISE AND FATIGUE: ICD-10-CM

## 2019-04-26 DIAGNOSIS — I48.20 CHRONIC ATRIAL FIBRILLATION (H): ICD-10-CM

## 2019-04-26 DIAGNOSIS — F01.50 VASCULAR DEMENTIA WITHOUT BEHAVIORAL DISTURBANCE (H): ICD-10-CM

## 2019-04-26 DIAGNOSIS — R53.81 MALAISE AND FATIGUE: ICD-10-CM

## 2019-04-26 DIAGNOSIS — I50.33 ACUTE ON CHRONIC DIASTOLIC HEART FAILURE (H): Primary | ICD-10-CM

## 2019-04-26 DIAGNOSIS — I27.20 PULMONARY HYPERTENSION, MODERATE TO SEVERE (H): ICD-10-CM

## 2019-04-26 DIAGNOSIS — Z79.01 CHRONIC ANTICOAGULATION: ICD-10-CM

## 2019-04-26 DIAGNOSIS — I50.33 ACUTE ON CHRONIC DIASTOLIC HEART FAILURE (H): ICD-10-CM

## 2019-04-26 LAB
ALBUMIN SERPL-MCNC: 3.5 G/DL (ref 3.5–5.7)
ALP SERPL-CCNC: 87 U/L (ref 34–104)
ALT SERPL W P-5'-P-CCNC: 14 U/L (ref 7–52)
ANION GAP SERPL CALCULATED.3IONS-SCNC: 9 MMOL/L (ref 3–14)
AST SERPL W P-5'-P-CCNC: 18 U/L (ref 13–39)
BASOPHILS # BLD AUTO: 0.1 10E9/L (ref 0–0.2)
BASOPHILS NFR BLD AUTO: 0.9 %
BILIRUB SERPL-MCNC: 0.6 MG/DL (ref 0.3–1)
BUN SERPL-MCNC: 11 MG/DL (ref 7–25)
CALCIUM SERPL-MCNC: 9.6 MG/DL (ref 8.6–10.3)
CHLORIDE SERPL-SCNC: 105 MMOL/L (ref 98–107)
CO2 SERPL-SCNC: 25 MMOL/L (ref 21–31)
CREAT SERPL-MCNC: 0.68 MG/DL (ref 0.6–1.2)
DIFFERENTIAL METHOD BLD: NORMAL
EOSINOPHIL # BLD AUTO: 0.1 10E9/L (ref 0–0.7)
EOSINOPHIL NFR BLD AUTO: 1.3 %
ERYTHROCYTE [DISTWIDTH] IN BLOOD BY AUTOMATED COUNT: 13.2 % (ref 10–15)
GFR SERPL CREATININE-BSD FRML MDRD: 81 ML/MIN/{1.73_M2}
GLUCOSE SERPL-MCNC: 120 MG/DL (ref 70–105)
HCT VFR BLD AUTO: 41.2 % (ref 35–47)
HGB BLD-MCNC: 13.8 G/DL (ref 11.7–15.7)
IMM GRANULOCYTES # BLD: 0 10E9/L (ref 0–0.4)
IMM GRANULOCYTES NFR BLD: 0.4 %
LYMPHOCYTES # BLD AUTO: 1.1 10E9/L (ref 0.8–5.3)
LYMPHOCYTES NFR BLD AUTO: 16 %
MCH RBC QN AUTO: 32.2 PG (ref 26.5–33)
MCHC RBC AUTO-ENTMCNC: 33.5 G/DL (ref 31.5–36.5)
MCV RBC AUTO: 96 FL (ref 78–100)
MONOCYTES # BLD AUTO: 0.6 10E9/L (ref 0–1.3)
MONOCYTES NFR BLD AUTO: 9.3 %
NEUTROPHILS # BLD AUTO: 4.9 10E9/L (ref 1.6–8.3)
NEUTROPHILS NFR BLD AUTO: 72.1 %
NT-PROBNP SERPL-MCNC: 278 PG/ML (ref 0–100)
PLATELET # BLD AUTO: 306 10E9/L (ref 150–450)
POTASSIUM SERPL-SCNC: 3.9 MMOL/L (ref 3.5–5.1)
PROT SERPL-MCNC: 6.9 G/DL (ref 6.4–8.9)
RBC # BLD AUTO: 4.28 10E12/L (ref 3.8–5.2)
SODIUM SERPL-SCNC: 139 MMOL/L (ref 134–144)
VIT B12 SERPL-MCNC: 993 PG/ML (ref 180–914)
WBC # BLD AUTO: 6.9 10E9/L (ref 4–11)

## 2019-04-26 PROCEDURE — 71046 X-RAY EXAM CHEST 2 VIEWS: CPT

## 2019-04-26 PROCEDURE — 99214 OFFICE O/P EST MOD 30 MIN: CPT | Performed by: INTERNAL MEDICINE

## 2019-04-26 PROCEDURE — 36415 COLL VENOUS BLD VENIPUNCTURE: CPT | Mod: ZL | Performed by: INTERNAL MEDICINE

## 2019-04-26 PROCEDURE — 85025 COMPLETE CBC W/AUTO DIFF WBC: CPT | Mod: ZL | Performed by: INTERNAL MEDICINE

## 2019-04-26 PROCEDURE — 82607 VITAMIN B-12: CPT | Mod: ZL | Performed by: INTERNAL MEDICINE

## 2019-04-26 PROCEDURE — 80053 COMPREHEN METABOLIC PANEL: CPT | Mod: ZL | Performed by: INTERNAL MEDICINE

## 2019-04-26 PROCEDURE — 83880 ASSAY OF NATRIURETIC PEPTIDE: CPT | Mod: ZL | Performed by: INTERNAL MEDICINE

## 2019-04-26 RX ORDER — TORSEMIDE 20 MG/1
20 TABLET ORAL 2 TIMES DAILY
Qty: 93 TABLET | Refills: 3 | Status: SHIPPED | OUTPATIENT
Start: 2019-04-26 | End: 2019-08-20

## 2019-04-26 RX ORDER — POTASSIUM CHLORIDE 750 MG/1
10 TABLET, EXTENDED RELEASE ORAL 2 TIMES DAILY
Qty: 180 TABLET | Refills: 3 | Status: SHIPPED | OUTPATIENT
Start: 2019-04-26 | End: 2019-08-20

## 2019-04-26 ASSESSMENT — PATIENT HEALTH QUESTIONNAIRE - PHQ9
5. POOR APPETITE OR OVEREATING: NOT AT ALL
SUM OF ALL RESPONSES TO PHQ QUESTIONS 1-9: 0

## 2019-04-26 ASSESSMENT — ENCOUNTER SYMPTOMS
EYE PAIN: 0
DIZZINESS: 0
LIGHT-HEADEDNESS: 0
COUGH: 1
AGITATION: 0
SHORTNESS OF BREATH: 1
VOMITING: 0
NAUSEA: 0
ARTHRALGIAS: 0
FEVER: 0
MYALGIAS: 0
WHEEZING: 1
DYSURIA: 0
CONFUSION: 1
HEMATURIA: 0
DIARRHEA: 0
FATIGUE: 1
CHILLS: 0
ABDOMINAL PAIN: 0
PALPITATIONS: 0
BRUISES/BLEEDS EASILY: 0

## 2019-04-26 ASSESSMENT — ANXIETY QUESTIONNAIRES
6. BECOMING EASILY ANNOYED OR IRRITABLE: NOT AT ALL
1. FEELING NERVOUS, ANXIOUS, OR ON EDGE: NOT AT ALL
GAD7 TOTAL SCORE: 0
IF YOU CHECKED OFF ANY PROBLEMS ON THIS QUESTIONNAIRE, HOW DIFFICULT HAVE THESE PROBLEMS MADE IT FOR YOU TO DO YOUR WORK, TAKE CARE OF THINGS AT HOME, OR GET ALONG WITH OTHER PEOPLE: NOT DIFFICULT AT ALL
5. BEING SO RESTLESS THAT IT IS HARD TO SIT STILL: NOT AT ALL
3. WORRYING TOO MUCH ABOUT DIFFERENT THINGS: NOT AT ALL
7. FEELING AFRAID AS IF SOMETHING AWFUL MIGHT HAPPEN: NOT AT ALL
2. NOT BEING ABLE TO STOP OR CONTROL WORRYING: NOT AT ALL

## 2019-04-26 ASSESSMENT — PAIN SCALES - GENERAL: PAINLEVEL: NO PAIN (0)

## 2019-04-26 NOTE — NURSING NOTE
"Patient presents to the clinic for weakness and shortness of breath with exertion over the past week.  Patient was placed on Potassium and Lasix when hospitalized in [place] 2 months ago.  Spouse does not know the strengths of either prescription.    Chief Complaint   Patient presents with     Shortness of Breath       Initial /74 (BP Location: Right arm, Patient Position: Sitting, Cuff Size: Adult Regular)   Pulse 84   Temp 97.4  F (36.3  C) (Tympanic)   Resp 20   Wt 67.6 kg (149 lb)   SpO2 95%   BMI 28.15 kg/m   Estimated body mass index is 28.15 kg/m  as calculated from the following:    Height as of 8/9/17: 1.549 m (5' 1\").    Weight as of this encounter: 67.6 kg (149 lb).  Medication Reconciliation: complete    Yashira Handy LPN    "

## 2019-04-26 NOTE — TELEPHONE ENCOUNTER
Spouse has concerns about possible fluid retention of the lungs.  Patient placed in the schedule for today with Adam Villagran MD.      Yashira Handy LPN 4/26/2019 3:05 PM

## 2019-04-26 NOTE — PATIENT INSTRUCTIONS
1. Acute on chronic diastolic heart failure (H)  Labs and chest xray today.     - XR Chest 2 Views; Future  - CBC and Differential; Future  - Comprehensive metabolic panel; Future  - Brain Natriuretic Peptide (BNP); Future    START:   - torsemide (DEMADEX) 20 MG tablet; Take 1 tablet (20 mg) by mouth 2 times daily AM and 2 PM -- for 3 days (4/27-4/29) -- then once Daily   - potassium chloride ER (K-DUR/KLOR-CON M) 10 MEQ CR tablet; Take 1 tablet (10 mEq) by mouth 2 times daily  Dispense: 180 tablet; Refill: 3    -- stop lasix/furosemide    -- Verify potassium dosing from her old prescription / tablets....       Call with update on Tuesday 4/30.       2. Vascular dementia without behavioral disturbance  3. Chronic atrial fibrillation (H)  4. Chronic anticoagulation -- continue Apixaban.     5. Malaise and fatigue  - Vitamin B12; Future -- check labs today.     -- Try adding a Super-B-Complex with B12 -- every other day -- to help energy / mood and balance.     -- Consider Under the tongue / Liquid. ([employer])    6. Pulmonary hypertension, moderate to severe (H)  -- can contribute to shortness of breath....     Return as needed for follow-up with Dr. Villagran.    Clinic : [phone]  Appointment line: [phone]

## 2019-04-26 NOTE — PROGRESS NOTES
"Nursing Notes:   Yashira Handy LPN  4/26/2019  3:46 PM  Signed  Patient presents to the clinic for weakness and shortness of breath with exertion over the past week.  Patient was placed on Potassium and Lasix when hospitalized in [place] 2 months ago.  Spouse does not know the strengths of either prescription.    Chief Complaint   Patient presents with     Shortness of Breath       Initial /74 (BP Location: Right arm, Patient Position: Sitting, Cuff Size: Adult Regular)   Pulse 84   Temp 97.4  F (36.3  C) (Tympanic)   Resp 20   Wt 67.6 kg (149 lb)   SpO2 95%   BMI 28.15 kg/m    Estimated body mass index is 28.15 kg/m  as calculated from the following:    Height as of 8/9/17: 1.549 m (5' 1\").    Weight as of this encounter: 67.6 kg (149 lb).  Medication Reconciliation: complete    Yashira Handy LPN    Nursing note reviewed with patient.  Accuracy and completeness verified.   Ms. Matos is a 89 year old female who:  Patient presents with:  Shortness of Breath      ICD-10-CM    1. Acute on chronic diastolic heart failure (H) I50.33 XR Chest 2 Views     CBC and Differential     Comprehensive metabolic panel     Brain Natriuretic Peptide (BNP)     torsemide (DEMADEX) 20 MG tablet     potassium chloride ER (K-DUR/KLOR-CON M) 10 MEQ CR tablet   2. Vascular dementia without behavioral disturbance F01.50    3. Chronic atrial fibrillation (H) I48.2    4. Chronic anticoagulation Z79.01    5. Malaise and fatigue R53.81 Vitamin B12    R53.83    6. Pulmonary hypertension, moderate to severe (H) I27.20      HPI  Patient has dementia and is not able to provide much of any reliable history.  Her  brings her in and provides all the history today.    States that she has been having much more windedness recently.  When done in [place] she was hospitalized in February due to heart failure.  Initially they thought may be pneumonia but ended up being treated for heart failure.  She was discharged on Lasix, he believes " 40 mg daily.  He believes her potassium tablets were 10 mg, and she was supposed to be taking 3 tablets daily.  She is only been able to take 2 tablets daily.  She has been taking her Lasix but has been having more issues with shortness of breath and even some edema again.  She is quite winded with walking just inside the house and coming in the clinic today she is very short of breath.  She catches of breath quite quickly after sitting and resting for 2 or 3 minutes.  Oxygen saturations improved up to 95%.    On examination her left lung fields are diminished in the base.  X-ray today shows pleural effusion and signs of heart failure.  We will change from Lasix over to torsemide.  Start potassium tablets.  Check labs and chest x-ray today.     has been giving her vitamin B12 Gummies to chew.  She has been doing fairly well with this but has been having more fatigue and malaise.  He would like labs checked today.    She has history of pulmonary hypertension which could easily contribute to her shortness of breath but given her heart failure on chest x-ray and examination, continue with plan as above.    She has chronic atrial fibrillation, heart rate seems controlled today.  Currently on diltiazem for heart rate control.  Seems to be doing well.  Continue apixaban for anticoagulation.    Recommend calling with update on Monday or Tuesday next week.  Advised close follow-up if needed for new or worsening symptoms.      Functional Capacity: less than 4 METS. + worsening recently.   Review of Systems   Unable to perform ROS: Dementia (History is from )   Constitutional: Positive for fatigue. Negative for chills and fever.   HENT: Negative for congestion and hearing loss.    Eyes: Negative for pain and visual disturbance.   Respiratory: Positive for cough (intermittent, some phlegm. ), shortness of breath and wheezing.    Cardiovascular: Negative for chest pain and palpitations.   Gastrointestinal:  Negative for abdominal pain, diarrhea, nausea and vomiting.   Endocrine: Negative for cold intolerance and heat intolerance.   Genitourinary: Negative for dysuria and hematuria.   Musculoskeletal: Negative for arthralgias and myalgias.   Skin: Negative for pallor.   Allergic/Immunologic: Negative for immunocompromised state.   Neurological: Negative for dizziness and light-headedness.   Hematological: Does not bruise/bleed easily.   Psychiatric/Behavioral: Positive for confusion. Negative for agitation.        MERLYN:   MERLYN-7 SCORE 8/9/2017 10/16/2018 4/26/2019   Total Score 0 0 0     PHQ9:  PHQ-9 SCORE 8/9/2017 10/16/2018 4/26/2019   PHQ-9 Total Score 0 0 0       I have personally reviewed the past medical history, past surgical history, medications, allergies, family and social history as listed below.     No Known Allergies    Current Outpatient Medications   Medication Sig Dispense Refill     apixaban ANTICOAGULANT (ELIQUIS) 5 MG tablet TAKE 1 TABLET BY MOUTH TWICE A  tablet 3     cyanocobalamin (RA VITAMIN B-12 TR) 1000 MCG TBCR Take 1 tablet by mouth once daily.       diltiazem (CARTIA XT) 120 MG 24 hr capsule TAKE 1 CAPSULE BY MOUTH EVERY DAY 90 capsule 3     lisinopril (PRINIVIL/ZESTRIL) 5 MG tablet Take 1 tablet (5 mg) by mouth daily 90 tablet 3     Multiple Vitamin (MULTI-VITAMINS) TABS Take 1 tablet by mouth once daily.       potassium chloride ER (K-DUR/KLOR-CON M) 10 MEQ CR tablet Take 1 tablet (10 mEq) by mouth 2 times daily 180 tablet 3     torsemide (DEMADEX) 20 MG tablet Take 1 tablet (20 mg) by mouth 2 times daily AM and 2 PM -- for 3 days (4/27-4/29) -- then once Daily -- stop lasix/furosemide 93 tablet 3        Patient Active Problem List    Diagnosis Date Noted     Dysthymic disorder 01/19/2018     Priority: Medium     Mixed hyperlipidemia 01/19/2018     Priority: Medium     Benign essential hypertension 01/19/2018     Priority: Medium     History of shingles 08/09/2017     Priority: Medium      Personal history of ongoing treatment with alendronate (Fosamax) 08/09/2017     Priority: Medium     Chronic anticoagulation 06/22/2016     Priority: Medium     History of peptic ulcer 06/22/2016     Priority: Medium     DDD (degenerative disc disease), cervical 06/11/2016     Priority: Medium     Hypomagnesemia 06/11/2016     Priority: Medium     Hyponatremia 06/11/2016     Priority: Medium     Neck pain 06/11/2016     Priority: Medium     Osteoporosis 06/11/2016     Priority: Medium     Chronic atrial fibrillation (H) 10/16/2015     Priority: Medium     Biatrial enlargement 10/03/2014     Priority: Medium     Acute on chronic diastolic heart failure (H) 10/03/2014     Priority: Medium     LVH (left ventricular hypertrophy) 10/03/2014     Priority: Medium     Pulmonary hypertension, moderate to severe (H) 10/03/2014     Priority: Medium     Collapse of lumbar vertebra due to osteoporosis (H) 10/02/2014     Priority: Medium     History of recurrent TIAs 10/02/2014     Priority: Medium     Vascular dementia without behavioral disturbance 10/02/2014     Priority: Medium     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,Billroth II gastrectomy     Past Surgical History:   Procedure Laterality Date     APPENDECTOMY OPEN      04/70     COLONOSCOPY      09/13/04     EXTRACAPSULAR CATARACT EXTRATION WITH INTRAOCULAR LENS IMPLANT      06/11, 07/11,bilateral     HEMORRHOID SURGERY      1960     HYSTERECTOMY TOTAL ABDOMINAL      04/70     OTHER SURGICAL HISTORY       3/2012,2070,CARDIOVERSION,of fibrillation in [place]     OTHER SURGICAL HISTORY      70,2085,CRYOTHERAPY OF CERVIX,Cervical conization     OTHER SURGICAL HISTORY      70,2085,CRYOTHERAPY OF CERVIX,Cervical cauterization     Social History     Socioeconomic History     Marital status:      Spouse name: None     Number of children: None     Years of education: None     Highest education level: None   Occupational History     None   Social Needs     Financial resource strain: None     Food insecurity:     Worry: None     Inability: None     Transportation needs:     Medical: None     Non-medical: None   Tobacco Use     Smoking status: Former Smoker     Packs/day: 0.50     Years: 20.00     Pack years: 10.00     Types: Cigarettes     Last attempt to quit: 1981     Years since quittin.3     Smokeless tobacco: Never Used   Substance and Sexual Activity     Alcohol use: Yes     Alcohol/week: 0.0 oz     Comment: Alcoholic Drinks/day: 3-4 times a week drinks 3 beers     Drug use: No     Sexual activity: Never   Lifestyle     Physical activity:     Days per week: None     Minutes per session: None     Stress: None   Relationships     Social connections:     Talks on phone: None     Gets together: None     Attends [employer] service: None     Active member of club or organization: None     Attends meetings of clubs or organizations: None     Relationship status: None     Intimate partner violence:     Fear of current or ex partner: None     Emotionally abused: None     Physically abused: None     Forced sexual activity: None   Other Topics Concern     Parent/sibling w/ CABG, MI or angioplasty before 65F 55M? Not Asked   Social History Narrative    ,  Stan.     Lives in [place].  Winter in [place].     Family History   Problem Relation Age of Onset     Other - See Comments Mother         Stroke     Cancer Father         Cancer,liver     Cancer Sister         Cancer,pancreatic      "Cancer Brother         Cancer,lung     Other - See Comments Other         Stroke,stroke     Other - See Comments Brother          in the war     Other - See Comments Daughter         MS     Other - See Comments Other         10 total, 1 still alive       EXAM:   Vitals:    19 1537   BP: 130/74   BP Location: Right arm   Patient Position: Sitting   Cuff Size: Adult Regular   Pulse: 84   Resp: 20   Temp: 97.4  F (36.3  C)   TempSrc: Tympanic   SpO2: 95%   Weight: 67.6 kg (149 lb)       Current Pain Score: No Pain (0)     BP Readings from Last 3 Encounters:   19 130/74   10/16/18 138/78   17 122/78      Wt Readings from Last 3 Encounters:   19 67.6 kg (149 lb)   10/16/18 67.7 kg (149 lb 4 oz)   17 64.1 kg (141 lb 6 oz)      Estimated body mass index is 28.15 kg/m  as calculated from the following:    Height as of 17: 1.549 m (5' 1\").    Weight as of this encounter: 67.6 kg (149 lb).     Physical Exam   Constitutional: She appears well-developed and well-nourished. No distress.   HENT:   Head: Normocephalic and atraumatic.   Eyes: Conjunctivae are normal. No scleral icterus.   Neck: Neck supple.   Cardiovascular: Normal rate and regular rhythm.   Pulmonary/Chest: Effort normal. She has no wheezes.   + Diminished in left base   Abdominal: Soft. There is no tenderness.   Musculoskeletal: She exhibits no deformity.   Lymphadenopathy:     She has no cervical adenopathy.   Neurological: She is alert.   Skin: Skin is warm and dry. No rash noted. She is not diaphoretic.   Psychiatric: She has a normal mood and affect.      Procedures   INVESTIGATIONS:    2019 - PROCEDURE:  XR CHEST 2 VW     HISTORY: Acute on chronic diastolic heart failure (H), .     COMPARISON:  2016     FINDINGS:  The cardiomediastinal contours are unchanged.  The trachea is midline.   There is calcific aortic atherosclerosis.  The vascular markings are cephalized. Interstitial markings are  coarsened. New " small left and trace right pleural effusions are  present.    Osteoporosis. Chronic compression deformities.                                                                      IMPRESSION:       Findings suggest CHF exacerbation with new small left, trace right  pleural effusions.       AYSHA THOMAS MD    Results for orders placed or performed in visit on 04/26/19   Vitamin B12   Result Value Ref Range    Vitamin B12 993 (H) 180 - 914 pg/mL   Brain Natriuretic Peptide (BNP)   Result Value Ref Range    N-Terminal Pro Bnp 278 (H) 0 - 100 pg/mL   Comprehensive metabolic panel   Result Value Ref Range    Sodium 139 134 - 144 mmol/L    Potassium 3.9 3.5 - 5.1 mmol/L    Chloride 105 98 - 107 mmol/L    Carbon Dioxide 25 21 - 31 mmol/L    Anion Gap 9 3 - 14 mmol/L    Glucose 120 (H) 70 - 105 mg/dL    Urea Nitrogen 11 7 - 25 mg/dL    Creatinine 0.68 0.60 - 1.20 mg/dL    GFR Estimate 81 >60 mL/min/[1.73_m2]    GFR Estimate If Black >90 >60 mL/min/[1.73_m2]    Calcium 9.6 8.6 - 10.3 mg/dL    Bilirubin Total 0.6 0.3 - 1.0 mg/dL    Albumin 3.5 3.5 - 5.7 g/dL    Protein Total 6.9 6.4 - 8.9 g/dL    Alkaline Phosphatase 87 34 - 104 U/L    ALT 14 7 - 52 U/L    AST 18 13 - 39 U/L   CBC and Differential   Result Value Ref Range    WBC 6.9 4.0 - 11.0 10e9/L    RBC Count 4.28 3.8 - 5.2 10e12/L    Hemoglobin 13.8 11.7 - 15.7 g/dL    Hematocrit 41.2 35.0 - 47.0 %    MCV 96 78 - 100 fl    MCH 32.2 26.5 - 33.0 pg    MCHC 33.5 31.5 - 36.5 g/dL    RDW 13.2 10.0 - 15.0 %    Platelet Count 306 150 - 450 10e9/L    Diff Method Automated Method     % Neutrophils 72.1 %    % Lymphocytes 16.0 %    % Monocytes 9.3 %    % Eosinophils 1.3 %    % Basophils 0.9 %    % Immature Granulocytes 0.4 %    Absolute Neutrophil 4.9 1.6 - 8.3 10e9/L    Absolute Lymphocytes 1.1 0.8 - 5.3 10e9/L    Absolute Monocytes 0.6 0.0 - 1.3 10e9/L    Absolute Eosinophils 0.1 0.0 - 0.7 10e9/L    Absolute Basophils 0.1 0.0 - 0.2 10e9/L    Abs Immature Granulocytes 0.0 0  - 0.4 10e9/L       ASSESSMENT AND PLAN:  Problem List Items Addressed This Visit        Nervous and Auditory    Vascular dementia without behavioral disturbance       Respiratory    Pulmonary hypertension, moderate to severe (H)       Circulatory    Chronic atrial fibrillation (H)    Acute on chronic diastolic heart failure (H) - Primary    Relevant Medications    torsemide (DEMADEX) 20 MG tablet    potassium chloride ER (K-DUR/KLOR-CON M) 10 MEQ CR tablet    Other Relevant Orders    XR Chest 2 Views    CBC and Differential    Comprehensive metabolic panel    Brain Natriuretic Peptide (BNP)       Other    Chronic anticoagulation      Other Visit Diagnoses     Malaise and fatigue        Relevant Orders    Vitamin B12        recommended sodium restriction  -- Expected clinical course discussed    -- Medications and their side effects discussed    Patient Instructions   1. Acute on chronic diastolic heart failure (H)  Labs and chest xray today.     - XR Chest 2 Views; Future  - CBC and Differential; Future  - Comprehensive metabolic panel; Future  - Brain Natriuretic Peptide (BNP); Future    START:   - torsemide (DEMADEX) 20 MG tablet; Take 1 tablet (20 mg) by mouth 2 times daily AM and 2 PM -- for 3 days (4/27-4/29) -- then once Daily   - potassium chloride ER (K-DUR/KLOR-CON M) 10 MEQ CR tablet; Take 1 tablet (10 mEq) by mouth 2 times daily  Dispense: 180 tablet; Refill: 3    -- stop lasix/furosemide    -- Verify potassium dosing from her old prescription / tablets....       Call with update on Tuesday 4/30.       2. Vascular dementia without behavioral disturbance  3. Chronic atrial fibrillation (H)  4. Chronic anticoagulation -- continue Apixaban.     5. Malaise and fatigue  - Vitamin B12; Future -- check labs today.     -- Try adding a Super-B-Complex with B12 -- every other day -- to help energy / mood and balance.     -- Consider Under the tongue / Liquid. ([employer])    6. Pulmonary hypertension, moderate to  severe (H)  -- can contribute to shortness of breath....     Return as needed for follow-up with Dr. Villagran.    Clinic : [phone]  Appointment line: [phone]     Adam Vlilagran MD  Internal Medicine  [facility]     Portions of this note were dictated using speech recognition software. The note has been proofread but errors in the text may have been overlooked. Please contact me if there are any concerns regarding the accuracy of the dictation.

## 2019-04-26 NOTE — LETTER
April 29, 2019      Anne Matos  [address] [place] [address]        Dear MsJamesdon,    We are writing to inform you of your test results.    Heart failure labs/BNP was elevated.  Chest x-ray also showed fluid in the chest/lungs --consistent with heart failure.  Hopefully you are breathing better.    Resulted Orders   Vitamin B12   Result Value Ref Range    Vitamin B12 993 (H) 180 - 914 pg/mL   Brain Natriuretic Peptide (BNP)   Result Value Ref Range    N-Terminal Pro Bnp 278 (H) 0 - 100 pg/mL      Comment:         Reference range shown and results flagged as abnormal are for the outpatient,   non acute settings. Establishing a baseline value for each individual patient   is useful for follow-up.  Suggested inpatient cut points for confirming diagnosis of CHF in an acute   setting are:   >450 pg/mL (age 18 to less than 50)   >900 pg/mL (age 50 to less than 75)   >1800 pg/mL (75 yrs and older)  An inpatient or emergency department NT-proPBNP <300 pg/mL effectively rules   out acute CHF, with 99% negative predictive value.      Comprehensive metabolic panel   Result Value Ref Range    Sodium 139 134 - 144 mmol/L    Potassium 3.9 3.5 - 5.1 mmol/L    Chloride 105 98 - 107 mmol/L    Carbon Dioxide 25 21 - 31 mmol/L    Anion Gap 9 3 - 14 mmol/L    Glucose 120 (H) 70 - 105 mg/dL    Urea Nitrogen 11 7 - 25 mg/dL    Creatinine 0.68 0.60 - 1.20 mg/dL    GFR Estimate 81 >60 mL/min/[1.73_m2]    GFR Estimate If Black >90 >60 mL/min/[1.73_m2]    Calcium 9.6 8.6 - 10.3 mg/dL    Bilirubin Total 0.6 0.3 - 1.0 mg/dL    Albumin 3.5 3.5 - 5.7 g/dL    Protein Total 6.9 6.4 - 8.9 g/dL    Alkaline Phosphatase 87 34 - 104 U/L    ALT 14 7 - 52 U/L    AST 18 13 - 39 U/L   CBC and Differential   Result Value Ref Range    WBC 6.9 4.0 - 11.0 10e9/L    RBC Count 4.28 3.8 - 5.2 10e12/L    Hemoglobin 13.8 11.7 - 15.7 g/dL    Hematocrit 41.2 35.0 - 47.0 %    MCV 96 78 - 100 fl    MCH 32.2 26.5 - 33.0 pg    MCHC 33.5 31.5 - 36.5  g/dL    RDW 13.2 10.0 - 15.0 %    Platelet Count 306 150 - 450 10e9/L    Diff Method Automated Method     % Neutrophils 72.1 %    % Lymphocytes 16.0 %    % Monocytes 9.3 %    % Eosinophils 1.3 %    % Basophils 0.9 %    % Immature Granulocytes 0.4 %    Absolute Neutrophil 4.9 1.6 - 8.3 10e9/L    Absolute Lymphocytes 1.1 0.8 - 5.3 10e9/L    Absolute Monocytes 0.6 0.0 - 1.3 10e9/L    Absolute Eosinophils 0.1 0.0 - 0.7 10e9/L    Absolute Basophils 0.1 0.0 - 0.2 10e9/L    Abs Immature Granulocytes 0.0 0 - 0.4 10e9/L       If you have any questions or concerns, please call the clinic at the number listed above.       Sincerely,        Adam Villagran MD

## 2019-04-26 NOTE — TELEPHONE ENCOUNTER
DJS-Spouse requesting work in appt for pt. Would not say. Please call. Thank you.  Mary Gordillo

## 2019-04-27 ASSESSMENT — ANXIETY QUESTIONNAIRES: GAD7 TOTAL SCORE: 0

## 2019-04-30 ENCOUNTER — TELEPHONE (OUTPATIENT)
Dept: INTERNAL MEDICINE | Facility: OTHER | Age: 84
End: 2019-04-30

## 2019-04-30 NOTE — TELEPHONE ENCOUNTER
Spouse reports that patient is still sleeping at this time-she sleeps this late at times.  Spouse only gives her 1 tablet Torsemide daily because she sleeps so late.  Spouse is wondering if he needs to wake her up early just to give the Torsemide so she can get 2 in a day instead of 1 per day.  Overall spouse indicates she is still having shortness of breath but it is not as bad as on Friday.      Yashira Handy LPN 4/30/2019 12:37 PM

## 2019-05-07 ENCOUNTER — TELEPHONE (OUTPATIENT)
Dept: INTERNAL MEDICINE | Facility: OTHER | Age: 84
End: 2019-05-07

## 2019-05-08 ENCOUNTER — OFFICE VISIT (OUTPATIENT)
Dept: INTERNAL MEDICINE | Facility: OTHER | Age: 84
End: 2019-05-08
Attending: INTERNAL MEDICINE
Payer: COMMERCIAL

## 2019-05-08 VITALS
HEART RATE: 76 BPM | TEMPERATURE: 97.3 F | SYSTOLIC BLOOD PRESSURE: 108 MMHG | RESPIRATION RATE: 20 BRPM | WEIGHT: 138.38 LBS | DIASTOLIC BLOOD PRESSURE: 66 MMHG | BODY MASS INDEX: 26.15 KG/M2

## 2019-05-08 DIAGNOSIS — R53.81 DECLINING FUNCTIONAL STATUS: Primary | ICD-10-CM

## 2019-05-08 DIAGNOSIS — F01.50 VASCULAR DEMENTIA WITHOUT BEHAVIORAL DISTURBANCE (H): ICD-10-CM

## 2019-05-08 DIAGNOSIS — I48.20 CHRONIC ATRIAL FIBRILLATION (H): ICD-10-CM

## 2019-05-08 DIAGNOSIS — Z79.01 CHRONIC ANTICOAGULATION: ICD-10-CM

## 2019-05-08 DIAGNOSIS — I50.33 ACUTE ON CHRONIC DIASTOLIC HEART FAILURE (H): ICD-10-CM

## 2019-05-08 PROCEDURE — 99214 OFFICE O/P EST MOD 30 MIN: CPT | Performed by: INTERNAL MEDICINE

## 2019-05-08 ASSESSMENT — ENCOUNTER SYMPTOMS
WHEEZING: 0
ARTHRALGIAS: 0
HEMATURIA: 0
NAUSEA: 0
CHILLS: 0
DYSURIA: 0
FATIGUE: 1
AGITATION: 0
BRUISES/BLEEDS EASILY: 0
ABDOMINAL PAIN: 0
DIZZINESS: 0
EYE PAIN: 0
FEVER: 0
SHORTNESS OF BREATH: 1
MYALGIAS: 0
CONFUSION: 1
VOMITING: 0
PALPITATIONS: 0
DIARRHEA: 0
LIGHT-HEADEDNESS: 1
COUGH: 0

## 2019-05-08 ASSESSMENT — ANXIETY QUESTIONNAIRES
GAD7 TOTAL SCORE: 0
5. BEING SO RESTLESS THAT IT IS HARD TO SIT STILL: NOT AT ALL
6. BECOMING EASILY ANNOYED OR IRRITABLE: NOT AT ALL
IF YOU CHECKED OFF ANY PROBLEMS ON THIS QUESTIONNAIRE, HOW DIFFICULT HAVE THESE PROBLEMS MADE IT FOR YOU TO DO YOUR WORK, TAKE CARE OF THINGS AT HOME, OR GET ALONG WITH OTHER PEOPLE: NOT DIFFICULT AT ALL
3. WORRYING TOO MUCH ABOUT DIFFERENT THINGS: NOT AT ALL
7. FEELING AFRAID AS IF SOMETHING AWFUL MIGHT HAPPEN: NOT AT ALL
1. FEELING NERVOUS, ANXIOUS, OR ON EDGE: NOT AT ALL
2. NOT BEING ABLE TO STOP OR CONTROL WORRYING: NOT AT ALL

## 2019-05-08 ASSESSMENT — PAIN SCALES - GENERAL: PAINLEVEL: NO PAIN (0)

## 2019-05-08 ASSESSMENT — PATIENT HEALTH QUESTIONNAIRE - PHQ9
5. POOR APPETITE OR OVEREATING: NOT AT ALL
SUM OF ALL RESPONSES TO PHQ QUESTIONS 1-9: 0

## 2019-05-08 NOTE — PATIENT INSTRUCTIONS
No obvious infectious issues at this time.   Breathing is better. Heart failure / fluid retention issues are better.     Try some Boost, Ensure, or Eleanor instant Breakfast -- for nutritional supplement.      -- Stop Lisinopril at this time.     Blood pressure checks at home - check some in AM, some in Afternoon, some in Evening and record   -- bring these with you to your next appointment.     Goal blood pressures -- less than 140 and less than 90.    -- Ideally would like the numbers about 110-130 and 70-80.  -- If running higher or lower than this on regular basis, will need to adjust your medications.        Return as needed for follow-up with Dr. Villagran.    Clinic : [phone]  Appointment line: [phone]

## 2019-05-08 NOTE — TELEPHONE ENCOUNTER
Spouse indicates that patient's breathing is a little better but she is still sleeping a lot more and her voice sounds weak.  Patient placed in the schedule with Adam Villagran MD today at 1500.      Yashira Handy LPN 5/8/2019 8:39 AM

## 2019-05-08 NOTE — NURSING NOTE
"Patient presents to the clinic for follow up with declining health per spouse.    Chief Complaint   Patient presents with     Clinic Care Coordination - Follow-up       Initial /66 (BP Location: Left arm, Patient Position: Sitting, Cuff Size: Adult Regular)   Pulse 76   Temp 97.3  F (36.3  C) (Tympanic)   Resp 20   Wt 62.8 kg (138 lb 6 oz)   BMI 26.15 kg/m   Estimated body mass index is 26.15 kg/m  as calculated from the following:    Height as of 8/9/17: 1.549 m (5' 1\").    Weight as of this encounter: 62.8 kg (138 lb 6 oz).  Medication Reconciliation: complete    Yashira Handy LPN    "

## 2019-05-08 NOTE — PROGRESS NOTES
"Nursing Notes:   Yashira Handy LPN  5/8/2019  3:44 PM  Signed  Patient presents to the clinic for follow up with declining health per spouse.    Chief Complaint   Patient presents with     Clinic Care Coordination - Follow-up       Initial /66 (BP Location: Left arm, Patient Position: Sitting, Cuff Size: Adult Regular)   Pulse 76   Temp 97.3  F (36.3  C) (Tympanic)   Resp 20   Wt 62.8 kg (138 lb 6 oz)   BMI 26.15 kg/m    Estimated body mass index is 26.15 kg/m  as calculated from the following:    Height as of 8/9/17: 1.549 m (5' 1\").    Weight as of this encounter: 62.8 kg (138 lb 6 oz).  Medication Reconciliation: complete    Yashira Handy LPN    Nursing note reviewed with patient.  Accuracy and completeness verified.   Ms. Matos is a 89 year old female who:  Patient presents with:  Clinic Care Coordination - Follow-up      ICD-10-CM    1. Declining functional status R53.81    2. Vascular dementia without behavioral disturbance F01.50    3. Acute on chronic diastolic heart failure (H) I50.33    4. Chronic atrial fibrillation (H) I48.2    5. Chronic anticoagulation Z79.01      HPI  Patient is brought in by her  due to declining functional status.  She is not eating much.  She is sleeping almost 20 hours/day.  States that her appetite is not very great.  She is more weak.  Her voice is getting weaker.  She was seen about a week ago and started on torsemide to help with her breathing.  She had acute heart failure at that time.   states that her breathing is doing a bit better, she seems to be resting more comfortably and is not having labored breathing anymore.    The last week or so she has been having more weakness.  She stayed in her pajamas for 3 or 4 days because she is did not feel like changing.    Overall states that she has been declining.  He is worried about her and not eating and drinking very much.    Advised pushing oral hydration, liquid calories.  If she does not eat " much for solid food that would be fine.  Try to keep her hydrated was advised.     states that she has been having some lightheaded symptoms.  Advised trial of holding lisinopril for a while and monitor blood home blood pressures.  He will let us know if there are any changes or if we need to restart the lisinopril.    Patient has vascular dementia and does not provide much of any history at all.  She is very pleasant today.  She was sleeping when we walked into the room to start her appointment today.    Patient has chronic atrial fibrillation, taking apixaban anticoagulation.    Functional Capacity: less than 4 METS.   Review of Systems   Unable to perform ROS: Dementia (History is from )   Constitutional: Positive for fatigue (  + sleeping 20+ hours daily). Negative for chills and fever.   HENT: Negative for congestion and hearing loss.    Eyes: Negative for pain and visual disturbance.   Respiratory: Positive for shortness of breath (  + improved with Torsemide). Negative for cough (intermittent, some phlegm. ) and wheezing.    Cardiovascular: Negative for chest pain and palpitations.   Gastrointestinal: Negative for abdominal pain, diarrhea, nausea and vomiting.   Endocrine: Negative for cold intolerance and heat intolerance.   Genitourinary: Negative for dysuria and hematuria.   Musculoskeletal: Negative for arthralgias and myalgias.   Skin: Negative for pallor.   Allergic/Immunologic: Negative for immunocompromised state.   Neurological: Positive for light-headedness. Negative for dizziness.   Hematological: Does not bruise/bleed easily.   Psychiatric/Behavioral: Positive for confusion. Negative for agitation.        + functionally declining. Voice is weaker. Decreased appetite. More weak. Not changing / getting dressed much in past week. Sleeping 20+ hrs daily.       MERLYN:   MERLYN-7 SCORE 10/16/2018 4/26/2019 5/8/2019   Total Score 0 0 0     PHQ9:  PHQ-9 SCORE 10/16/2018 4/26/2019 5/8/2019   PHQ-9  Total Score 0 0 0       I have personally reviewed the past medical history, past surgical history, medications, allergies, family and social history as listed below.     No Known Allergies    Current Outpatient Medications   Medication Sig Dispense Refill     apixaban ANTICOAGULANT (ELIQUIS) 5 MG tablet TAKE 1 TABLET BY MOUTH TWICE A  tablet 3     cyanocobalamin (RA VITAMIN B-12 TR) 1000 MCG TBCR Take 1 tablet by mouth once daily.       diltiazem (CARTIA XT) 120 MG 24 hr capsule TAKE 1 CAPSULE BY MOUTH EVERY DAY 90 capsule 3     lisinopril (PRINIVIL/ZESTRIL) 5 MG tablet Take 1 tablet (5 mg) by mouth daily 90 tablet 3     Multiple Vitamin (MULTI-VITAMINS) TABS Take 1 tablet by mouth once daily.       potassium chloride ER (K-DUR/KLOR-CON M) 10 MEQ CR tablet Take 1 tablet (10 mEq) by mouth 2 times daily 180 tablet 3     torsemide (DEMADEX) 20 MG tablet Take 1 tablet (20 mg) by mouth 2 times daily AM and 2 PM -- for 3 days (4/27-4/29) -- then once Daily -- stop lasix/furosemide 93 tablet 3        Patient Active Problem List    Diagnosis Date Noted     Dysthymic disorder 01/19/2018     Priority: Medium     Mixed hyperlipidemia 01/19/2018     Priority: Medium     Benign essential hypertension 01/19/2018     Priority: Medium     History of shingles 08/09/2017     Priority: Medium     Personal history of ongoing treatment with alendronate (Fosamax) 08/09/2017     Priority: Medium     Chronic anticoagulation 06/22/2016     Priority: Medium     History of peptic ulcer 06/22/2016     Priority: Medium     DDD (degenerative disc disease), cervical 06/11/2016     Priority: Medium     Hypomagnesemia 06/11/2016     Priority: Medium     Hyponatremia 06/11/2016     Priority: Medium     Neck pain 06/11/2016     Priority: Medium     Osteoporosis 06/11/2016     Priority: Medium     Chronic atrial fibrillation (H) 10/16/2015     Priority: Medium     Biatrial enlargement 10/03/2014     Priority: Medium     Acute on chronic  diastolic heart failure (H) 10/03/2014     Priority: Medium     LVH (left ventricular hypertrophy) 10/03/2014     Priority: Medium     Pulmonary hypertension, moderate to severe (H) 10/03/2014     Priority: Medium     Collapse of lumbar vertebra due to osteoporosis (H) 10/02/2014     Priority: Medium     History of recurrent TIAs 10/02/2014     Priority: Medium     Vascular dementia without behavioral disturbance 10/02/2014     Priority: Medium     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,Billroth II gastrectomy     Past Surgical History:   Procedure Laterality Date     APPENDECTOMY OPEN      04/70     COLONOSCOPY      09/13/04     EXTRACAPSULAR CATARACT EXTRATION WITH INTRAOCULAR LENS IMPLANT      06/11, 07/11,bilateral     HEMORRHOID SURGERY      1960     HYSTERECTOMY TOTAL ABDOMINAL      04/70     OTHER SURGICAL HISTORY      3/2012,207069,CARDIOVERSION,of fibrillation in [place]     OTHER SURGICAL HISTORY      02/26/70,208526,CRYOTHERAPY OF CERVIX,Cervical conization     OTHER SURGICAL HISTORY      03/08/70,208526,CRYOTHERAPY OF CERVIX,Cervical cauterization     Social History     Socioeconomic History     Marital status:      Spouse name: None     Number of children: None     Years of education: None     Highest education level: None   Occupational History     None   Social Needs     Financial resource strain: None     Food insecurity:     Worry: None     Inability: None     Transportation needs:     Medical: None     Non-medical: None   Tobacco Use      Smoking status: Former Smoker     Packs/day: 0.50     Years: 20.00     Pack years: 10.00     Types: Cigarettes     Last attempt to quit: 1981     Years since quittin.3     Smokeless tobacco: Never Used   Substance and Sexual Activity     Alcohol use: Yes     Alcohol/week: 0.0 oz     Comment: Alcoholic Drinks/day: 3-4 times a week drinks 3 beers     Drug use: No     Sexual activity: Never   Lifestyle     Physical activity:     Days per week: None     Minutes per session: None     Stress: None   Relationships     Social connections:     Talks on phone: None     Gets together: None     Attends L[employer] day service: None     Active member of club or organization: None     Attends meetings of clubs or organizations: None     Relationship status: None     Intimate partner violence:     Fear of current or ex partner: None     Emotionally abused: None     Physically abused: None     Forced sexual activity: None   Other Topics Concern     Parent/sibling w/ CABG, MI or angioplasty before 65F 55M? Not Asked   Social History Narrative    ,  Stan.     Lives in [place].  Winter in [place].     Family History   Problem Relation Age of Onset     Other - See Comments Mother         Stroke     Cancer Father         Cancer,liver     Cancer Sister         Cancer,pancreatic     Cancer Brother         Cancer,lung     Other - See Comments Other         Stroke,stroke     Other - See Comments Brother          in the war     Other - See Comments Daughter         MS     Other - See Comments Other         10 total, 1 still alive       EXAM:   Vitals:    19 1518   BP: 108/66   BP Location: Left arm   Patient Position: Sitting   Cuff Size: Adult Regular   Pulse: 76   Resp: 20   Temp: 97.3  F (36.3  C)   TempSrc: Tympanic   Weight: 62.8 kg (138 lb 6 oz)       Current Pain Score: No Pain (0)     BP Readings from Last 3 Encounters:   19 108/66   19 130/74   10/16/18 138/78      Wt Readings from Last 3  "Encounters:   05/08/19 62.8 kg (138 lb 6 oz)   04/26/19 67.6 kg (149 lb)   10/16/18 67.7 kg (149 lb 4 oz)      Estimated body mass index is 26.15 kg/m  as calculated from the following:    Height as of 8/9/17: 1.549 m (5' 1\").    Weight as of this encounter: 62.8 kg (138 lb 6 oz).     Physical Exam   Constitutional: She appears well-developed and well-nourished. No distress.   HENT:   Head: Normocephalic and atraumatic.   Eyes: Conjunctivae are normal. No scleral icterus.   Neck: Neck supple.   Cardiovascular: Normal rate. An irregularly irregular rhythm present.   Pulmonary/Chest: Effort normal.   Abdominal: Soft. There is no tenderness.   Musculoskeletal: She exhibits no deformity.   + in wheelchair today   Lymphadenopathy:     She has no cervical adenopathy.   Neurological: She is alert.   + confusion   Skin: Skin is warm and dry. No rash noted. She is not diaphoretic.   Psychiatric: She has a normal mood and affect.      Procedures   INVESTIGATIONS:  Results for orders placed or performed during the hospital encounter of 04/26/19   XR Chest 2 Views    Narrative    PROCEDURE:  XR CHEST 2 VW    HISTORY: Acute on chronic diastolic heart failure (H), .    COMPARISON:  6/13/2016    FINDINGS:  The cardiomediastinal contours are unchanged.  The trachea is midline.   There is calcific aortic atherosclerosis.  The vascular markings are cephalized. Interstitial markings are  coarsened. New small left and trace right pleural effusions are  present.    Osteoporosis. Chronic compression deformities.      Impression    IMPRESSION:      Findings suggest CHF exacerbation with new small left, trace right  pleural effusions.      AYSHA THOMAS MD       ASSESSMENT AND PLAN:  Problem List Items Addressed This Visit        Nervous and Auditory    Vascular dementia without behavioral disturbance       Circulatory    Chronic atrial fibrillation (H)    Acute on chronic diastolic heart failure (H)       Other    Chronic " anticoagulation      Other Visit Diagnoses     Declining functional status    -  Primary        recommended sodium restriction  -- Expected clinical course discussed    -- Medications and their side effects discussed    25 minutes spent in face-to-face interaction with patient and  (separate from separately billed procedures) with greater than 50% spent in counseling and care coordination of listed medical problems.      Patient Instructions   No obvious infectious issues at this time.   Breathing is better. Heart failure / fluid retention issues are better.     Try some Boost, Ensure, or [place] instant Breakfast -- for nutritional supplement.      -- Stop Lisinopril at this time.     Blood pressure checks at home - check some in AM, some in Afternoon, some in Evening and record   -- bring these with you to your next appointment.     Goal blood pressures -- less than 140 and less than 90.    -- Ideally would like the numbers about 110-130 and 70-80.  -- If running higher or lower than this on regular basis, will need to adjust your medications.        Return as needed for follow-up with Dr. Villagran.    Clinic : [phone]  Appointment line: [phone]     Adam Villagran MD  Internal Medicine  [facility]     Portions of this note were dictated using speech recognition software. The note has been proofread but errors in the text may have been overlooked. Please contact me if there are any concerns regarding the accuracy of the dictation.

## 2019-05-09 ASSESSMENT — ANXIETY QUESTIONNAIRES: GAD7 TOTAL SCORE: 0

## 2019-07-14 DIAGNOSIS — I48.20 CHRONIC ATRIAL FIBRILLATION (H): ICD-10-CM

## 2019-07-18 RX ORDER — APIXABAN 5 MG/1
TABLET, FILM COATED ORAL
Qty: 180 TABLET | Refills: 0 | OUTPATIENT
Start: 2019-07-18

## 2019-08-19 ENCOUNTER — TELEPHONE (OUTPATIENT)
Dept: INTERNAL MEDICINE | Facility: OTHER | Age: 84
End: 2019-08-19

## 2019-08-19 NOTE — TELEPHONE ENCOUNTER
DJS-spoke with spouse-he found a lump under her arm?-was hard to understand. Asking for call from nurse to discuss if this can wait, have a work in appt or see someone else. Thank you.  Mary Gordillo

## 2019-08-19 NOTE — TELEPHONE ENCOUNTER
Patient  states is wondering if should have her checked, found a lump in left arm pit. Doesn't seem to be bothered or having pain from it. Is not sure how long it has been there, that patient had told  of it.     [facility] had an opening tomorrow, patient schedule.  Yojana Ayers LPN .............8/19/2019     9:48 AM

## 2019-08-20 ENCOUNTER — OFFICE VISIT (OUTPATIENT)
Dept: INTERNAL MEDICINE | Facility: OTHER | Age: 84
End: 2019-08-20
Attending: INTERNAL MEDICINE
Payer: COMMERCIAL

## 2019-08-20 VITALS
RESPIRATION RATE: 16 BRPM | HEART RATE: 76 BPM | HEIGHT: 61 IN | TEMPERATURE: 98.7 F | SYSTOLIC BLOOD PRESSURE: 128 MMHG | WEIGHT: 140.5 LBS | DIASTOLIC BLOOD PRESSURE: 66 MMHG | BODY MASS INDEX: 26.53 KG/M2

## 2019-08-20 DIAGNOSIS — I50.33 ACUTE ON CHRONIC DIASTOLIC HEART FAILURE (H): ICD-10-CM

## 2019-08-20 DIAGNOSIS — Z74.09 POOR MOBILITY: ICD-10-CM

## 2019-08-20 DIAGNOSIS — F01.50 VASCULAR DEMENTIA WITHOUT BEHAVIORAL DISTURBANCE (H): ICD-10-CM

## 2019-08-20 DIAGNOSIS — I10 BENIGN ESSENTIAL HYPERTENSION: ICD-10-CM

## 2019-08-20 DIAGNOSIS — N63.20 LEFT BREAST LUMP: Primary | ICD-10-CM

## 2019-08-20 DIAGNOSIS — I48.20 CHRONIC ATRIAL FIBRILLATION (H): ICD-10-CM

## 2019-08-20 PROCEDURE — 99214 OFFICE O/P EST MOD 30 MIN: CPT | Performed by: INTERNAL MEDICINE

## 2019-08-20 RX ORDER — DILTIAZEM HYDROCHLORIDE 120 MG/1
CAPSULE, COATED, EXTENDED RELEASE ORAL
Qty: 90 CAPSULE | Refills: 3 | Status: SHIPPED | OUTPATIENT
Start: 2019-08-20 | End: 2020-09-02

## 2019-08-20 RX ORDER — LISINOPRIL 5 MG/1
5 TABLET ORAL DAILY
Qty: 90 TABLET | Refills: 3 | Status: SHIPPED | OUTPATIENT
Start: 2019-08-20 | End: 2020-09-02

## 2019-08-20 RX ORDER — POTASSIUM CHLORIDE 750 MG/1
10 TABLET, EXTENDED RELEASE ORAL 2 TIMES DAILY
Qty: 180 TABLET | Refills: 3 | Status: SHIPPED | OUTPATIENT
Start: 2019-08-20 | End: 2020-09-02

## 2019-08-20 RX ORDER — TORSEMIDE 20 MG/1
20 TABLET ORAL DAILY
Qty: 90 TABLET | Refills: 3 | Status: SHIPPED | OUTPATIENT
Start: 2019-08-20 | End: 2020-09-02

## 2019-08-20 ASSESSMENT — ENCOUNTER SYMPTOMS
EYE PAIN: 0
ARTHRALGIAS: 0
NAUSEA: 0
LIGHT-HEADEDNESS: 1
PALPITATIONS: 0
FATIGUE: 1
WHEEZING: 0
MYALGIAS: 0
FEVER: 0
DIARRHEA: 0
HEMATURIA: 0
VOMITING: 0
CONFUSION: 1
COUGH: 0
AGITATION: 0
BRUISES/BLEEDS EASILY: 0
DYSURIA: 0
CHILLS: 0
ABDOMINAL PAIN: 0
DIZZINESS: 0
SHORTNESS OF BREATH: 1

## 2019-08-20 ASSESSMENT — PATIENT HEALTH QUESTIONNAIRE - PHQ9
5. POOR APPETITE OR OVEREATING: NOT AT ALL
SUM OF ALL RESPONSES TO PHQ QUESTIONS 1-9: 0

## 2019-08-20 ASSESSMENT — ANXIETY QUESTIONNAIRES
GAD7 TOTAL SCORE: 0
3. WORRYING TOO MUCH ABOUT DIFFERENT THINGS: NOT AT ALL
IF YOU CHECKED OFF ANY PROBLEMS ON THIS QUESTIONNAIRE, HOW DIFFICULT HAVE THESE PROBLEMS MADE IT FOR YOU TO DO YOUR WORK, TAKE CARE OF THINGS AT HOME, OR GET ALONG WITH OTHER PEOPLE: NOT DIFFICULT AT ALL
7. FEELING AFRAID AS IF SOMETHING AWFUL MIGHT HAPPEN: NOT AT ALL
2. NOT BEING ABLE TO STOP OR CONTROL WORRYING: NOT AT ALL
6. BECOMING EASILY ANNOYED OR IRRITABLE: NOT AT ALL
1. FEELING NERVOUS, ANXIOUS, OR ON EDGE: NOT AT ALL
5. BEING SO RESTLESS THAT IT IS HARD TO SIT STILL: NOT AT ALL

## 2019-08-20 ASSESSMENT — PAIN SCALES - GENERAL: PAINLEVEL: NO PAIN (0)

## 2019-08-20 ASSESSMENT — MIFFLIN-ST. JEOR: SCORE: 995.71

## 2019-08-20 NOTE — PROGRESS NOTES
"Nursing Notes:   Yashira Handy LPN  8/20/2019  1:30 PM  Signed  Patient presents to the clinic for hard lump of the left breast.  Patient is unable to indicate how long it has been there but she informed her  this past weekend.  Spouse would like to get a wheelchair prescription if possible.    Chief Complaint   Patient presents with     Mass     left breast lump       Initial /66 (BP Location: Right arm, Patient Position: Sitting, Cuff Size: Adult Regular)   Pulse 76   Temp 98.7  F (37.1  C) (Tympanic)   Resp 16   Ht 1.543 m (5' 0.75\")   Wt 63.7 kg (140 lb 8 oz)   BMI 26.77 kg/m    Estimated body mass index is 26.77 kg/m  as calculated from the following:    Height as of this encounter: 1.543 m (5' 0.75\").    Weight as of this encounter: 63.7 kg (140 lb 8 oz).  Medication Reconciliation: complete    Yashira Handy LPN    Nursing note reviewed with patient.  Accuracy and completeness verified.   Ms. Matos is a 89 year old female who:  Patient presents with:  Mass: left breast lump      ICD-10-CM    1. Left breast lump N63.20 MA Diagnostic Bilateral w/Gaurang     US Breast Left Limited 1-3 Quadrants   2. Chronic atrial fibrillation (H) I48.2 apixaban ANTICOAGULANT (ELIQUIS) 5 MG tablet     diltiazem ER COATED BEADS (CARTIA XT) 120 MG 24 hr capsule     order for DME   3. Benign essential hypertension I10 lisinopril (PRINIVIL/ZESTRIL) 5 MG tablet   4. Acute on chronic diastolic heart failure (H) I50.33 torsemide (DEMADEX) 20 MG tablet     potassium chloride ER (K-DUR/KLOR-CON M) 10 MEQ CR tablet     order for DME   5. Vascular dementia without behavioral disturbance F01.50 order for DME   6. Poor mobility Z74.09 order for DME     HPI  Initially patient was scheduled for left axillary lump.  In fact it is actually a far lateral left breast lump on exam today.  Needs breast imaging, mammogram and ultrasound.  Orders placed.  There is dimpling of the skin and the lump is firm.  Concern for breast " cancer.  Full breast exam was not performed today - only palpation of the lateral breast lump.   was in the room and was helping with examination.  Patient was never derobed/exposed.     Chronic atrial fibrillation, takes apixaban.  She will likely need to hold this prior to any type of breast biopsy.  Okay to hold for 2 to 5 days as needed.    Hypertension, currently well controlled.  Tolerating medication regimen.  Med list updated.  Refill sent to pharmacy.    Diastolic heart failure, currently stable.  Doing well with torsemide.    Vascular dementia, chronic, stable.  She is not able to provide any review of systems.    + Poor mobility, she is not able to follow directions.  Needs a wheelchair.    Functional Capacity: less than 4 METS. + mobility is poor. Confusion, can't use cane / walker.    helps. Needs wheelchair when going out of the house, to the store / clinic. rx printed.     Review of Systems   Unable to perform ROS: Dementia (History is from )   Constitutional: Positive for fatigue (  + sleeping 20+ hours daily). Negative for chills and fever.   HENT: Negative for congestion and hearing loss.    Eyes: Negative for pain and visual disturbance.   Respiratory: Positive for shortness of breath (  + improved with Torsemide). Negative for cough (intermittent, some phlegm. ) and wheezing.    Cardiovascular: Negative for chest pain and palpitations.   Gastrointestinal: Negative for abdominal pain, diarrhea, nausea and vomiting.   Endocrine: Negative for cold intolerance and heat intolerance.   Genitourinary: Negative for dysuria and hematuria.        + new left breast lump, just mentioned to  over the weekend 8/17/2019.   Musculoskeletal: Negative for arthralgias and myalgias.   Skin: Negative for pallor.   Allergic/Immunologic: Negative for immunocompromised state.   Neurological: Positive for light-headedness. Negative for dizziness.   Hematological: Does not bruise/bleed easily.    Psychiatric/Behavioral: Positive for confusion. Negative for agitation.        + functionally declining. Voice is weaker. Decreased appetite. More weak. Not changing / getting dressed much in past week. Sleeping 20+ hrs daily.       MERLYN:   MERLYN-7 SCORE 4/26/2019 5/8/2019 8/20/2019   Total Score 0 0 0     PHQ9:  PHQ-9 SCORE 4/26/2019 5/8/2019 8/20/2019   PHQ-9 Total Score 0 0 0       I have personally reviewed the past medical history, past surgical history, medications, allergies, family and social history as listed below.     No Known Allergies    Current Outpatient Medications   Medication Sig Dispense Refill     apixaban ANTICOAGULANT (ELIQUIS) 5 MG tablet TAKE 1 TABLET BY MOUTH TWICE A  tablet 3     cyanocobalamin (RA VITAMIN B-12 TR) 1000 MCG TBCR Take 1 tablet by mouth once daily.       diltiazem ER COATED BEADS (CARTIA XT) 120 MG 24 hr capsule TAKE 1 CAPSULE BY MOUTH EVERY DAY 90 capsule 3     lisinopril (PRINIVIL/ZESTRIL) 5 MG tablet Take 1 tablet (5 mg) by mouth daily 90 tablet 3     Multiple Vitamin (MULTI-VITAMINS) TABS Take 1 tablet by mouth once daily.       order for DME Equipment being ordered: Wheelchair with swing away foot rests 1 each 0     potassium chloride ER (K-DUR/KLOR-CON M) 10 MEQ CR tablet Take 1 tablet (10 mEq) by mouth 2 times daily 180 tablet 3     torsemide (DEMADEX) 20 MG tablet Take 1 tablet (20 mg) by mouth daily 90 tablet 3        Patient Active Problem List    Diagnosis Date Noted     Poor mobility 08/20/2019     Priority: Medium     Left breast lump 08/20/2019     Priority: Medium     Dysthymic disorder 01/19/2018     Priority: Medium     Mixed hyperlipidemia 01/19/2018     Priority: Medium     Benign essential hypertension 01/19/2018     Priority: Medium     History of shingles 08/09/2017     Priority: Medium     Personal history of ongoing treatment with alendronate (Fosamax) 08/09/2017     Priority: Medium     Chronic anticoagulation 06/22/2016     Priority: Medium      History of peptic ulcer 06/22/2016     Priority: Medium     DDD (degenerative disc disease), cervical 06/11/2016     Priority: Medium     Hypomagnesemia 06/11/2016     Priority: Medium     Hyponatremia 06/11/2016     Priority: Medium     Neck pain 06/11/2016     Priority: Medium     Osteoporosis 06/11/2016     Priority: Medium     Chronic atrial fibrillation (H) 10/16/2015     Priority: Medium     Biatrial enlargement 10/03/2014     Priority: Medium     Acute on chronic diastolic heart failure (H) 10/03/2014     Priority: Medium     LVH (left ventricular hypertrophy) 10/03/2014     Priority: Medium     Pulmonary hypertension, moderate to severe (H) 10/03/2014     Priority: Medium     Collapse of lumbar vertebra due to osteoporosis (H) 10/02/2014     Priority: Medium     History of recurrent TIAs 10/02/2014     Priority: Medium     Vascular dementia without behavioral disturbance 10/02/2014     Priority: Medium     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,Billroth II gastrectomy     Past Surgical History:   Procedure Laterality Date     APPENDECTOMY OPEN      04/70     COLONOSCOPY      09/13/04     EXTRACAPSULAR CATARACT EXTRATION WITH INTRAOCULAR LENS IMPLANT      06/11, 07/11,bilateral     HEMORRHOID SURGERY      1960     HYSTERECTOMY TOTAL ABDOMINAL      04/70     OTHER SURGICAL HISTORY      3/2012,207069,CARDIOVERSION,of fibrillation in [place]     OTHER SURGICAL HISTORY      02/26/70,208526,CRYOTHERAPY OF CERVIX,Cervical conization     OTHER SURGICAL HISTORY       70,432615,CRYOTHERAPY OF CERVIX,Cervical cauterization     Social History     Socioeconomic History     Marital status:      Spouse name: None     Number of children: None     Years of education: None     Highest education level: None   Occupational History     None   Social Needs     Financial resource strain: None     Food insecurity:     Worry: None     Inability: None     Transportation needs:     Medical: None     Non-medical: None   Tobacco Use     Smoking status: Former Smoker     Packs/day: 0.50     Years: 20.00     Pack years: 10.00     Types: Cigarettes     Last attempt to quit: 1981     Years since quittin.6     Smokeless tobacco: Never Used   Substance and Sexual Activity     Alcohol use: Yes     Alcohol/week: 0.0 oz     Comment: Alcoholic Drinks/day: 3-4 times a week drinks 3 beers     Drug use: No     Sexual activity: Never   Lifestyle     Physical activity:     Days per week: None     Minutes per session: None     Stress: None   Relationships     Social connections:     Talks on phone: None     Gets together: None     Attends [employer] service: None     Active member of club or organization: None     Attends meetings of clubs or organizations: None     Relationship status: None     Intimate partner violence:     Fear of current or ex partner: None     Emotionally abused: None     Physically abused: None     Forced sexual activity: None   Other Topics Concern     Parent/sibling w/ CABG, MI or angioplasty before 65F 55M? Not Asked   Social History Narrative    ,  Stan.     Lives in [place].  Winter in [place].     Family History   Problem Relation Age of Onset     Other - See Comments Mother         Stroke     Cancer Father         Cancer,liver     Cancer Sister         Cancer,pancreatic     Cancer Brother         Cancer,lung     Other - See Comments Other         Stroke,stroke     Other - See Comments Brother          in the war     Other - See Comments  "Daughter         MS     Other - See Comments Other         10 total, 1 still alive       EXAM:   Vitals:    08/20/19 1320   BP: 128/66   BP Location: Right arm   Patient Position: Sitting   Cuff Size: Adult Regular   Pulse: 76   Resp: 16   Temp: 98.7  F (37.1  C)   TempSrc: Tympanic   Weight: 63.7 kg (140 lb 8 oz)   Height: 1.543 m (5' 0.75\")       Current Pain Score: No Pain (0)     BP Readings from Last 3 Encounters:   08/20/19 128/66   05/08/19 108/66   04/26/19 130/74      Wt Readings from Last 3 Encounters:   08/20/19 63.7 kg (140 lb 8 oz)   05/08/19 62.8 kg (138 lb 6 oz)   04/26/19 67.6 kg (149 lb)      Estimated body mass index is 26.77 kg/m  as calculated from the following:    Height as of this encounter: 1.543 m (5' 0.75\").    Weight as of this encounter: 63.7 kg (140 lb 8 oz).     Physical Exam   Constitutional: She appears well-developed and well-nourished. No distress.   HENT:   Head: Normocephalic and atraumatic.   Eyes: Conjunctivae are normal. No scleral icterus.   Neck: Neck supple.   Cardiovascular: Normal rate. An irregularly irregular rhythm present.   Pulmonary/Chest: Effort normal.   Abdominal: Soft. There is no tenderness.   Musculoskeletal: She exhibits no deformity.   + in wheelchair today   Lymphadenopathy:     She has no cervical adenopathy.   Neurological: She is alert.   + confusion   Skin: Skin is warm and dry. No rash noted. She is not diaphoretic.   Lateral left breast with skin dimpling, firm mass measuring about 5 to 7 cm in diameter lateral breast tissue.    Psychiatric: She has a normal mood and affect.      Procedures   INVESTIGATIONS:  Results for orders placed or performed during the hospital encounter of 04/26/19   XR Chest 2 Views    Narrative    PROCEDURE:  XR CHEST 2 VW    HISTORY: Acute on chronic diastolic heart failure (H), .    COMPARISON:  6/13/2016    FINDINGS:  The cardiomediastinal contours are unchanged.  The trachea is midline.   There is calcific aortic " atherosclerosis.  The vascular markings are cephalized. Interstitial markings are  coarsened. New small left and trace right pleural effusions are  present.    Osteoporosis. Chronic compression deformities.      Impression    IMPRESSION:      Findings suggest CHF exacerbation with new small left, trace right  pleural effusions.      AYSHA THOMAS MD       ASSESSMENT AND PLAN:  Problem List Items Addressed This Visit        Nervous and Auditory    Vascular dementia without behavioral disturbance    Relevant Medications    order for DME       Circulatory    Chronic atrial fibrillation (H)    Relevant Medications    apixaban ANTICOAGULANT (ELIQUIS) 5 MG tablet    diltiazem ER COATED BEADS (CARTIA XT) 120 MG 24 hr capsule    order for DME    Acute on chronic diastolic heart failure (H)    Relevant Medications    torsemide (DEMADEX) 20 MG tablet    potassium chloride ER (K-DUR/KLOR-CON M) 10 MEQ CR tablet    order for DME    Benign essential hypertension    Relevant Medications    lisinopril (PRINIVIL/ZESTRIL) 5 MG tablet       Other    Poor mobility    Relevant Medications    order for DME    Left breast lump - Primary    Relevant Orders    MA Diagnostic Bilateral w/Gaurang    US Breast Left Limited 1-3 Quadrants        reviewed diet, exercise and weight control, recommended sodium restriction  -- Expected clinical course discussed    -- Medications and their side effects discussed    Patient Instructions   Left breast lump noted --- check mammogram and ultrasound  - they will call with date/time of appointment.      Meds refilled.     Rx printed for wheelchair.     Return as needed for follow-up with Dr. Villagran.    Clinic : [phone]  Appointment line: [phone] or [phone]     Adam Villagran MD  Internal Medicine  [facility]     Portions of this note were dictated using speech recognition software. The note has been proofread but errors in the text may have been overlooked.  Please contact me if there are any concerns regarding the accuracy of the dictation.

## 2019-08-20 NOTE — PATIENT INSTRUCTIONS
Left breast lump noted --- check mammogram and ultrasound  - they will call with date/time of appointment.      Meds refilled.     Rx printed for wheelchair.     Return as needed for follow-up with Dr. Villagran.    Clinic : [phone]  Appointment line: [phone] or [phone]

## 2019-08-20 NOTE — NURSING NOTE
"Patient presents to the clinic for hard lump of the left breast.  Patient is unable to indicate how long it has been there but she informed her  this past weekend.  Spouse would like to get a wheelchair prescription if possible.    Chief Complaint   Patient presents with     Mass     left breast lump       Initial /66 (BP Location: Right arm, Patient Position: Sitting, Cuff Size: Adult Regular)   Pulse 76   Temp 98.7  F (37.1  C) (Tympanic)   Resp 16   Ht 1.543 m (5' 0.75\")   Wt 63.7 kg (140 lb 8 oz)   BMI 26.77 kg/m   Estimated body mass index is 26.77 kg/m  as calculated from the following:    Height as of this encounter: 1.543 m (5' 0.75\").    Weight as of this encounter: 63.7 kg (140 lb 8 oz).  Medication Reconciliation: complete    Yashira Handy LPN    "

## 2019-08-21 ASSESSMENT — ANXIETY QUESTIONNAIRES: GAD7 TOTAL SCORE: 0

## 2019-08-23 ENCOUNTER — HOSPITAL ENCOUNTER (OUTPATIENT)
Dept: ULTRASOUND IMAGING | Facility: OTHER | Age: 84
Discharge: HOME | End: 2019-08-23
Attending: INTERNAL MEDICINE | Admitting: INTERNAL MEDICINE
Payer: COMMERCIAL

## 2019-08-23 ENCOUNTER — HOSPITAL ENCOUNTER (OUTPATIENT)
Dept: MAMMOGRAPHY | Facility: OTHER | Age: 84
End: 2019-08-23
Attending: INTERNAL MEDICINE
Payer: COMMERCIAL

## 2019-08-23 DIAGNOSIS — N63.20 LEFT BREAST LUMP: ICD-10-CM

## 2019-08-23 PROCEDURE — 76642 ULTRASOUND BREAST LIMITED: CPT | Mod: LT

## 2019-08-23 PROCEDURE — G0279 TOMOSYNTHESIS, MAMMO: HCPCS

## 2019-08-23 PROCEDURE — 77066 DX MAMMO INCL CAD BI: CPT

## 2019-08-27 ENCOUNTER — OFFICE VISIT (OUTPATIENT)
Dept: SURGERY | Facility: OTHER | Age: 84
End: 2019-08-27
Attending: SURGERY
Payer: COMMERCIAL

## 2019-08-27 VITALS
SYSTOLIC BLOOD PRESSURE: 128 MMHG | DIASTOLIC BLOOD PRESSURE: 60 MMHG | BODY MASS INDEX: 26.06 KG/M2 | TEMPERATURE: 98.2 F | HEART RATE: 72 BPM | HEIGHT: 61 IN | WEIGHT: 138 LBS | RESPIRATION RATE: 16 BRPM

## 2019-08-27 DIAGNOSIS — Z79.01 CHRONIC ANTICOAGULATION: ICD-10-CM

## 2019-08-27 DIAGNOSIS — Z86.73 HISTORY OF RECURRENT TIAS: ICD-10-CM

## 2019-08-27 DIAGNOSIS — F01.50 VASCULAR DEMENTIA WITHOUT BEHAVIORAL DISTURBANCE (H): ICD-10-CM

## 2019-08-27 DIAGNOSIS — N63.20 LARGE MASS OF LEFT BREAST: Primary | ICD-10-CM

## 2019-08-27 DIAGNOSIS — I27.20 PULMONARY HYPERTENSION, MODERATE TO SEVERE (H): ICD-10-CM

## 2019-08-27 PROCEDURE — 99244 OFF/OP CNSLTJ NEW/EST MOD 40: CPT | Performed by: SURGERY

## 2019-08-27 ASSESSMENT — MIFFLIN-ST. JEOR: SCORE: 984.37

## 2019-08-27 ASSESSMENT — PAIN SCALES - GENERAL: PAINLEVEL: NO PAIN (0)

## 2019-08-27 NOTE — PROGRESS NOTES
OFFICE CONSULTATION NOTE  Patient Name: Anne Matos  Address: [id number] [place] [address]   [place] [address]  Age:89 year old  Sex: female     Primary Care Physician: Adam Villagran MD    I was requested to see this patient in consultation by  Adam Villagran MD  for evaluation of left breast lump. A copy of this note will be sent to Adam Villagran MD.    HPI:   The patient is 89 year old female with a new lump in her left breast. She is a difficult historian due to her dementia. She denies breast pain bilaterally. She has no nipple drainage bilaterally. She has not noted any new skin lesions on the breasts. She is unsure how long the nodule has been there. The nodule is not tender.  No family history of breast cancer.   The patient hasn't had biopsy performed.       CONSULTATION ASSESSMENT ANDPLAN/RECOMMENDATIONS:   I discussed with the patient and her  the pathophysiology of breast nodules, breast cancer and breast disease. We specifically discussed that most lumps are not breast cancer but that this large lump is very suspicious for cancer. We discussed options for definitive diagnosis, including: biopsy, excision and close follow up. We specifically discussed the risks and benefits of each alternative. I explained that any surgery or biopsy has a risk of infection, bleeding, scarring and the possible need for further procedures. I explained that the left breast lump is too large to do an excision. I discussed the option of biopsy for ER/WI status and the possibility of palliation with hormone therapy. We discussed that with her age and her medical issues, chemotherapy would likely not be an option. We discussed that radiation treatment would likely not be recommended but that a consult could possibly be done to discuss it. The patient's  had his questions answered. He expressed understanding and wishes to proceed with biopsy. The procedure will be scheduled at a time that is convenient for the  patient. Follow up in the office to discuss results. Ok to be off blood thinner the day before the biopsy and the morning of the biopsy (half life is 6 hours). Can take eliquis in the afternoon on the day of biopsy (after the biopsy is done).    REVIEW OF SYSTEMS-difficult to obtain from patient due to dementia  GENERAL: denies fevers or chills. Denies fatigue, recent weight loss.  HEENT: No sinus drainage. No changes with vision or hearing. No difficulty swallowing.   LYMPHATICS:  No swollen nodes in axilla, neck or groin.  CARDIOVASCULAR: Denies chest pain, palpitations and dyspnea on exertion.  PULMONARY: No shortness of breath or cough. No increase in sputum production.  GI: Denies melena, bright red blood in stools. No hematemesis. No constipation or diarrhea.  : No dysuria or hematuria.  SKIN: No recent rashes or ulcers.   HEMATOLOGY: Has easy bruising  ENDOCRINE:  No history of diabetes or thyroid problems.  NEUROLOGY:  No new motor or sensory changes.  BREAST:  Denies breast pain.  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,Billroth II gastrectomy     Past Surgical History:   Procedure Laterality Date     APPENDECTOMY OPEN      04/70     COLONOSCOPY      09/13/04     EXTRACAPSULAR CATARACT EXTRATION WITH INTRAOCULAR LENS IMPLANT      06/11, 07/11,bilateral     HEMORRHOID SURGERY      1960     HYSTERECTOMY TOTAL ABDOMINAL      04/70     OTHER SURGICAL HISTORY      3/2012,207069,CARDIOVERSION,of fibrillation in [place]     OTHER SURGICAL HISTORY       70,051000,CRYOTHERAPY OF CERVIX,Cervical conization     OTHER SURGICAL HISTORY      70,428896,CRYOTHERAPY OF CERVIX,Cervical cauterization     Current Outpatient Medications   Medication     apixaban ANTICOAGULANT (ELIQUIS) 5 MG tablet     cyanocobalamin (RA VITAMIN B-12 TR) 1000 MCG TBCR     diltiazem ER COATED BEADS (CARTIA XT) 120 MG 24 hr capsule     lisinopril (PRINIVIL/ZESTRIL) 5 MG tablet     Multiple Vitamin (MULTI-VITAMINS) TABS     order for DME     potassium chloride ER (K-DUR/KLOR-CON M) 10 MEQ CR tablet     torsemide (DEMADEX) 20 MG tablet     No current facility-administered medications for this visit.      ALLERGIES/SENSITIVITIES  No Known Allergies  Family History   Problem Relation Age of Onset     Other - See Comments Mother         Stroke     Cancer Father         Cancer,liver     Cancer Sister         Cancer,pancreatic     Cancer Brother         Cancer,lung     Other - See Comments Other         Stroke,stroke     Other - See Comments Brother          in the war     Other - See Comments Daughter         MS     Other - See Comments Other         10 total, 1 still alive     Social History     Socioeconomic History     Marital status:      Spouse name: None     Number of children: None     Years of education: None     Highest education level: None   Occupational History     None   Social Needs     Financial resource strain: None     Food insecurity:     Worry: None     Inability: None     Transportation needs:     Medical: None     Non-medical: None   Tobacco Use     Smoking status: Former Smoker     Packs/day: 0.50     Years: 20.00     Pack years: 10.00     Types: Cigarettes     Last attempt to quit: 1981     Years since quittin.6     Smokeless tobacco: Never Used   Substance and Sexual Activity     Alcohol use: Yes     Alcohol/week: 0.0 oz     Comment: Alcoholic Drinks/day: 3-4 times a week drinks 3 beers     Drug use: No     Sexual activity: Not Currently  "  Lifestyle     Physical activity:     Days per week: None     Minutes per session: None     Stress: None   Relationships     Social connections:     Talks on phone: None     Gets together: None     Attends [employer] service: None     Active member of club or organization: None     Attends meetings of clubs or organizations: None     Relationship status: None     Intimate partner violence:     Fear of current or ex partner: None     Emotionally abused: None     Physically abused: None     Forced sexual activity: None   Other Topics Concern     Parent/sibling w/ CABG, MI or angioplasty before 65F 55M? Not Asked   Social History Narrative    ,  Stan.     Lives in [place].  Winter in [place].       The above history was reviewed and updated today, 8/27/2019  PHYSICAL EXAM  /60 (BP Location: Right arm, Patient Position: Sitting, Cuff Size: Adult Regular)   Pulse 72   Temp 98.2  F (36.8  C) (Tympanic)   Resp 16   Ht 1.543 m (5' 0.75\")   Wt 62.6 kg (138 lb)   BMI 26.29 kg/m    GENERAL: elderly confused patient in no acute distress. Pleasant.   HEENT:Head-normocephalic. Eyes-no scleral icterus. Nose-no nasal drainage. No lesions. Mouth-oral mucosa pink and moist, no lesions.  NECK: Supple. Trachea midline.  LYMPHATICS:  No cervical, axillary or supraclavicular adenopathy.  CV: Regular rate and rhythm, no murmurs. No peripheral edema.  LUNGS:  No respiratory distress. Clear bilaterally to auscultation.  ABDOMEN: Non distended. Bowel sounds active. Soft, non-tender, no hepatosplenomegaly or hernias. No peritoneal signs.  SKIN: Pink, warm and dry. No jaundice. No rash.  NEURO:  Cranial nerves II-XII grossly intact.Alert and oriented.  PSYCH: Appropriate mood.  BREAST: Breasts were examined in the seated and supine position. Mass noted almost the entire left breast with skin changes of retraction at the 2-3 o'clock position without ulceration. No nipple discharge bilaterally. No tenderness " noted.    IMAGING/LAB  I personally reviewed patient's recent mammogram and US images and reports.

## 2019-08-27 NOTE — PATIENT INSTRUCTIONS
No Eliquis the day before the biopsy-not morning or afternoon. No Eliquis the morning of the biopsy. Ok to take it in the afternoon,  after the biopsy. See me after the biopsy to discuss the results.

## 2019-08-27 NOTE — NURSING NOTE
"Chief Complaint   Patient presents with     Consult     left breast       Initial /60 (BP Location: Right arm, Patient Position: Sitting, Cuff Size: Adult Regular)   Pulse 72   Temp 98.2  F (36.8  C) (Tympanic)   Resp 16   Ht 1.543 m (5' 0.75\")   Wt 62.6 kg (138 lb)   BMI 26.29 kg/m   Estimated body mass index is 26.29 kg/m  as calculated from the following:    Height as of this encounter: 1.543 m (5' 0.75\").    Weight as of this encounter: 62.6 kg (138 lb).  Medication Reconciliation: complete    Yashira Montes LPN    How many children do you have? 6  What age did your menstrual cycle start? Doesn't remember  How old were you when your first child was born? 18  Did you breast feed? yes  Are you on or have you ever taken any hormone replacement or birth control? no  Do you have a family history of breast cancer? no  Yashira Montes LPN..........8/27/2019  2:59 PM      "

## 2019-09-03 ENCOUNTER — HOSPITAL ENCOUNTER (OUTPATIENT)
Dept: ULTRASOUND IMAGING | Facility: OTHER | Age: 84
Discharge: HOME | End: 2019-09-03
Attending: SURGERY | Admitting: SURGERY
Payer: COMMERCIAL

## 2019-09-03 VITALS — SYSTOLIC BLOOD PRESSURE: 124 MMHG | DIASTOLIC BLOOD PRESSURE: 72 MMHG | HEART RATE: 80 BPM | RESPIRATION RATE: 14 BRPM

## 2019-09-03 DIAGNOSIS — N63.20 LEFT BREAST MASS: ICD-10-CM

## 2019-09-03 PROCEDURE — 88377 M/PHMTRC ALYS ISHQUANT/SEMIQ: CPT

## 2019-09-03 PROCEDURE — 88305 TISSUE EXAM BY PATHOLOGIST: CPT

## 2019-09-03 PROCEDURE — 88361 TUMOR IMMUNOHISTOCHEM/COMPUT: CPT

## 2019-09-03 PROCEDURE — 88342 IMHCHEM/IMCYTCHM 1ST ANTB: CPT

## 2019-09-03 PROCEDURE — 88341 IMHCHEM/IMCYTCHM EA ADD ANTB: CPT

## 2019-09-03 PROCEDURE — 88360 TUMOR IMMUNOHISTOCHEM/MANUAL: CPT

## 2019-09-03 PROCEDURE — 19083 BX BREAST 1ST LESION US IMAG: CPT | Mod: LT

## 2019-09-03 PROCEDURE — 25000125 ZZHC RX 250: Performed by: RADIOLOGY

## 2019-09-03 RX ORDER — LIDOCAINE HYDROCHLORIDE AND EPINEPHRINE 10; 10 MG/ML; UG/ML
20 INJECTION, SOLUTION INFILTRATION; PERINEURAL ONCE
Status: COMPLETED | OUTPATIENT
Start: 2019-09-03 | End: 2019-09-03

## 2019-09-03 RX ORDER — LIDOCAINE HYDROCHLORIDE 10 MG/ML
20 INJECTION, SOLUTION EPIDURAL; INFILTRATION; INTRACAUDAL; PERINEURAL ONCE
Status: COMPLETED | OUTPATIENT
Start: 2019-09-03 | End: 2019-09-03

## 2019-09-03 RX ADMIN — LIDOCAINE HYDROCHLORIDE,EPINEPHRINE BITARTRATE 9 ML: 10; .01 INJECTION, SOLUTION INFILTRATION; PERINEURAL at 10:30

## 2019-09-03 RX ADMIN — LIDOCAINE HYDROCHLORIDE 4 ML: 10 INJECTION, SOLUTION INFILTRATION; PERINEURAL at 10:26

## 2019-09-03 NOTE — PROGRESS NOTES
Patient here for ultrasound guided biopsy of left breast.  Procedure reviewed with patient and  by writer and radiologist, questions answered.  Time out performed prior to biopsy.  Biopsy completed by radiologist, clip placed.  Pressure held to biopsy site for 15 minutes.  Dressing consisting  2x2 guaze, and tegaderm applied.   No Post clip mammogram completed per radiologist.  Discharge instructions reviewed with patient and , patient verbalizes understanding of instructions.  Escorted to car in wheelchair.  Discharged to home in stable condition with no evidence of bleeding from biopsy site.   Alai Messina RN.

## 2019-09-03 NOTE — DISCHARGE INSTRUCTIONS
"NEEDLE BIOPSY BREAST    Activity: Rest the remainder of the day. You may resume normal activity after the next day. Avoid any vigorous/strenuous physical activity for 24 hours.    Comfort: If you have discomfort or tenderness at the site you may take your usual or recommended pain medication. Do not take aspirin the day of the procedure or for 48 hours following the biopsy.    Diet: You may resume your usual diet.    Care of site: Leave ice pack in place for 4 hours, or until it is no longer cold. The ice pack is reusable and may be refrozen.  Keep your bra and the dressing on for 24 hours. Then you may remove the bandage and shower. If there are steri-strips you may remove them in 3 to 5 days.  You may have some discomfort and a small amount of bruising where the biopsy was performed. This is normal. For several days or even a couple of weeks, you may have tenderness or \"twinges\" and a tiny bump where the needle went into the skin. This can be bothersome, but is not abnormal. You can use warm moist washcloths, as this may help. Do Not Use A Heating Pad.    RETURN TO THE EMERGENCY ROOM FOR:   Shortness of breath   Rapid heart rate   If pain becomes worse    Call Your Doctor For:    A fever over 101 degrees   Increased redness, increased swelling, and/or persistent drainage/discomfort  around the site    Other: At the end of your breast biopsy, a tiny titanium clip will be inserted through the biopsy needle and placed at the biopsy site within your breast. The marker provides a landmark of the biopsy for further mammograms or surgical procedures. This marker is MRI compatible and poses no known health risks.    You will be receiving a letter in the mail from [facility] Department with your biopsy results.  In 6 months a mammogram will be needed to establish a new baseline and to recheck the area where the biopsy occurred. Our radiology department will call you to schedule an appointment.    For " "questions, problems or concerns, contact the Radiology Department at [phone].    NEEDLE BIOPSY BREAST    Activity: Rest the remainder of the day. You may resume normal activity after the next day. Avoid any vigorous/strenuous physical activity for 24 hours.    Comfort: If you have discomfort or tenderness at the site you may take your usual or recommended pain medication. Do not take aspirin the day of the procedure or for 48 hours following the biopsy.    Diet: You may resume your usual diet.    Care of site: Leave ice pack in place for 4 hours, or until it is no longer cold. The ice pack is reusable and may be refrozen.  Keep your bra and the dressing on for 24 hours. Then you may remove the bandage and shower. If there are steri-strips you may remove them in 3 to 5 days.  You may have some discomfort and a small amount of bruising where the biopsy was performed. This is normal. For several days or even a couple of weeks, you may have tenderness or \"twinges\" and a tiny bump where the needle went into the skin. This can be bothersome, but is not abnormal. You can use warm moist washcloths, as this may help. Do Not Use A Heating Pad.    RETURN TO THE EMERGENCY ROOM FOR:   Shortness of breath   Rapid heart rate   If pain becomes worse    Call Your Doctor For:    A fever over 101 degrees   Increased redness, increased swelling, and/or persistent drainage/discomfort  around the site    Other: At the end of your breast biopsy, a tiny titanium clip will be inserted through the biopsy needle and placed at the biopsy site within your breast. The marker provides a landmark of the biopsy for further mammograms or surgical procedures. This marker is MRI compatible and poses no known health risks.    You will be receiving a letter in the mail from [facility] Department with your biopsy results.  In 6 months a mammogram will be needed to establish a new baseline and to recheck the area where the biopsy occurred. " Our radiology department will call you to schedule an appointment.    For questions, problems or concerns, contact the Radiology Department at [phone].

## 2019-09-04 ENCOUNTER — TELEPHONE (OUTPATIENT)
Dept: SURGERY | Facility: OTHER | Age: 84
End: 2019-09-04

## 2019-09-04 NOTE — TELEPHONE ENCOUNTER
Talked with patient's .  States she didn't have any pain and no bleeding.  Will attend results appointment on 9/10/19 at 9:50.  Alia Messina RN.

## 2019-09-10 ENCOUNTER — OFFICE VISIT (OUTPATIENT)
Dept: SURGERY | Facility: OTHER | Age: 84
End: 2019-09-10
Attending: SURGERY
Payer: COMMERCIAL

## 2019-09-10 DIAGNOSIS — C50.112 MALIGNANT NEOPLASM OF CENTRAL PORTION OF LEFT BREAST IN FEMALE, ESTROGEN RECEPTOR POSITIVE (H): Primary | ICD-10-CM

## 2019-09-10 DIAGNOSIS — Z17.0 MALIGNANT NEOPLASM OF CENTRAL PORTION OF LEFT BREAST IN FEMALE, ESTROGEN RECEPTOR POSITIVE (H): Primary | ICD-10-CM

## 2019-09-10 PROCEDURE — 99213 OFFICE O/P EST LOW 20 MIN: CPT | Performed by: SURGERY

## 2019-09-10 NOTE — PROGRESS NOTES
Primary Care Physician: Adam Villagran MD    HPI:   Patient with large lump in left breast with skin puckering. She had biopsy done. Her  reports she has done well. She isn't accompanying him today for results.   A biopsy was done showing invasive breast cancer. ER/OH + and [id number] negative    CONSULTATION ASSESSMENT AND PLAN/RECOMMENDATIONS:   Breast cancer left breast  I discussed with the patient's  the pathophysiology of breast cancer. We discussed that with her tumor size, age and significant medical issues, she wouldn't be a candidate for surgery. She would also not likely have benefits that outweigh risks with chemotherapy. She may benefit from hormonal therapy and I recommend referral to oncology to discuss this option. Not recommending PET or MRI due to inability to tolerate. Patient's  is in agreement. He will call with questions or concerns.   More than 30 minutes was spent with the family and nurse navigator to discuss management plan for Anne.      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,Billroth II gastrectomy     Past Surgical History:   Procedure Laterality Date     APPENDECTOMY OPEN      04/70     COLONOSCOPY      09/13/04     EXTRACAPSULAR CATARACT EXTRATION WITH INTRAOCULAR LENS IMPLANT      06/11, 07/11,bilateral     HEMORRHOID SURGERY      1960     HYSTERECTOMY TOTAL ABDOMINAL      04/70     OTHER SURGICAL HISTORY      3/2012,207069,CARDIOVERSION,of fibrillation in [place]     OTHER SURGICAL HISTORY       70,568679,CRYOTHERAPY OF CERVIX,Cervical conization     OTHER SURGICAL HISTORY      70,801420,CRYOTHERAPY OF CERVIX,Cervical cauterization     Current Outpatient Medications   Medication     apixaban ANTICOAGULANT (ELIQUIS) 5 MG tablet     cyanocobalamin (RA VITAMIN B-12 TR) 1000 MCG TBCR     diltiazem ER COATED BEADS (CARTIA XT) 120 MG 24 hr capsule     lisinopril (PRINIVIL/ZESTRIL) 5 MG tablet     Multiple Vitamin (MULTI-VITAMINS) TABS     order for DME     potassium chloride ER (K-DUR/KLOR-CON M) 10 MEQ CR tablet     torsemide (DEMADEX) 20 MG tablet     No current facility-administered medications for this visit.      No Known Allergies  Family History   Problem Relation Age of Onset     Other - See Comments Mother         Stroke     Cancer Father         Cancer,liver     Cancer Sister         Cancer,pancreatic     Cancer Brother         Cancer,lung     Other - See Comments Other         Stroke,stroke     Other - See Comments Brother          in the war     Other - See Comments Daughter         MS     Other - See Comments Other         10 total, 1 still alive     Social History     Socioeconomic History     Marital status:      Spouse name: None     Number of children: None     Years of education: None     Highest education level: None   Occupational History     None   Social Needs     Financial resource strain: None     Food insecurity:     Worry: None     Inability: None     Transportation needs:     Medical: None     Non-medical: None   Tobacco Use     Smoking status: Former Smoker     Packs/day: 0.50     Years: 20.00     Pack years: 10.00     Types: Cigarettes     Last attempt to quit: 1981     Years since quittin.7     Smokeless tobacco: Never Used   Substance and Sexual Activity     Alcohol use: Yes     Alcohol/week: 0.0 oz     Comment: Alcoholic Drinks/day: 3-4 times a week drinks 3 beers     Drug use: No     Sexual activity: Not Currently   Lifestyle     Physical activity:      Days per week: None     Minutes per session: None     Stress: None   Relationships     Social connections:     Talks on phone: None     Gets together: None     Attends Christian service: None     Active member of club or organization: None     Attends meetings of clubs or organizations: None     Relationship status: None     Intimate partner violence:     Fear of current or ex partner: None     Emotionally abused: None     Physically abused: None     Forced sexual activity: None   Other Topics Concern     Parent/sibling w/ CABG, MI or angioplasty before 65F 55M? Not Asked   Social History Narrative    ,  Stan.     Lives in [place].  [place] in [place].     The above history was reviewed today, 9/10/2019    IMAGING/LAB  I personally reviewed patient's US and biopsy images and reports and pathology results.

## 2019-09-12 ENCOUNTER — TELEPHONE (OUTPATIENT)
Dept: SURGERY | Facility: OTHER | Age: 84
End: 2019-09-12

## 2019-09-12 NOTE — TELEPHONE ENCOUNTER
Patient's  called to see if there is any way Anne could see Dr. Fuller sooner than her scheduled appointment.  Checked with Kate in oncology who states there are no openings at this time, but they will call if there are any cancellations.  Message left for Harry.

## 2019-09-20 ENCOUNTER — TELEPHONE (OUTPATIENT)
Dept: INTERNAL MEDICINE | Facility: OTHER | Age: 84
End: 2019-09-20

## 2019-09-20 DIAGNOSIS — R60.9 FLUID RETENTION: Primary | ICD-10-CM

## 2019-09-20 RX ORDER — TORSEMIDE 20 MG/1
20 TABLET ORAL DAILY
Qty: 5 TABLET | Refills: 0 | Status: SHIPPED | OUTPATIENT
Start: 2019-09-20 | End: 2019-09-26

## 2019-09-20 NOTE — TELEPHONE ENCOUNTER
1. Fluid retention  START:   - torsemide (DEMADEX) 20 MG tablet; Take 1 tablet (20 mg) by mouth daily for 5 days --- take with usual medications x5 days - for fluid retention      Dispense: 5 tablet; Refill: 0    Please call with update in the middle of next week.     Adam Villagran MD

## 2019-09-20 NOTE — TELEPHONE ENCOUNTER
Patient has had a cough with congestion over the past 3-4 days, last night patient's cough was wet/gurgling.  Spouse denies any change in food/fluid intake or mental status.  No fluid retention in the left but spouse thinks that there is some in her abdomen because is looks a little bigger.  No increase in fatigue, patient still take 20 mg Torsemide daily.      Yashira Handy LPN 9/20/2019 10:38 AM

## 2019-09-26 ENCOUNTER — ONCOLOGY VISIT (OUTPATIENT)
Dept: ONCOLOGY | Facility: OTHER | Age: 84
End: 2019-09-26
Attending: SURGERY
Payer: COMMERCIAL

## 2019-09-26 VITALS
BODY MASS INDEX: 26.06 KG/M2 | RESPIRATION RATE: 16 BRPM | HEIGHT: 61 IN | HEART RATE: 64 BPM | TEMPERATURE: 98.1 F | WEIGHT: 138 LBS | SYSTOLIC BLOOD PRESSURE: 100 MMHG | OXYGEN SATURATION: 93 % | DIASTOLIC BLOOD PRESSURE: 68 MMHG

## 2019-09-26 DIAGNOSIS — J40 BRONCHITIS: Primary | ICD-10-CM

## 2019-09-26 DIAGNOSIS — Z17.0 MALIGNANT NEOPLASM OF CENTRAL PORTION OF LEFT BREAST IN FEMALE, ESTROGEN RECEPTOR POSITIVE (H): ICD-10-CM

## 2019-09-26 DIAGNOSIS — C50.112 MALIGNANT NEOPLASM OF CENTRAL PORTION OF LEFT BREAST IN FEMALE, ESTROGEN RECEPTOR POSITIVE (H): ICD-10-CM

## 2019-09-26 LAB
ALBUMIN SERPL-MCNC: 3.7 G/DL (ref 3.5–5.7)
ALP SERPL-CCNC: 72 U/L (ref 34–104)
ALT SERPL W P-5'-P-CCNC: 9 U/L (ref 7–52)
ANION GAP SERPL CALCULATED.3IONS-SCNC: 7 MMOL/L (ref 3–14)
AST SERPL W P-5'-P-CCNC: 16 U/L (ref 13–39)
BASOPHILS # BLD AUTO: 0 10E9/L (ref 0–0.2)
BASOPHILS NFR BLD AUTO: 0.4 %
BILIRUB SERPL-MCNC: 0.6 MG/DL (ref 0.3–1)
BUN SERPL-MCNC: 24 MG/DL (ref 7–25)
CALCIUM SERPL-MCNC: 10 MG/DL (ref 8.6–10.3)
CHLORIDE SERPL-SCNC: 100 MMOL/L (ref 98–107)
CO2 SERPL-SCNC: 33 MMOL/L (ref 21–31)
CREAT SERPL-MCNC: 1 MG/DL (ref 0.6–1.2)
DIFFERENTIAL METHOD BLD: NORMAL
EOSINOPHIL # BLD AUTO: 0.1 10E9/L (ref 0–0.7)
EOSINOPHIL NFR BLD AUTO: 1.4 %
ERYTHROCYTE [DISTWIDTH] IN BLOOD BY AUTOMATED COUNT: 11.8 % (ref 10–15)
GFR SERPL CREATININE-BSD FRML MDRD: 52 ML/MIN/{1.73_M2}
GLUCOSE SERPL-MCNC: 116 MG/DL (ref 70–105)
HCT VFR BLD AUTO: 45.5 % (ref 35–47)
HGB BLD-MCNC: 14.8 G/DL (ref 11.7–15.7)
IMM GRANULOCYTES # BLD: 0 10E9/L (ref 0–0.4)
IMM GRANULOCYTES NFR BLD: 0.3 %
LDH SERPL L TO P-CCNC: 172 U/L (ref 140–271)
LYMPHOCYTES # BLD AUTO: 1.2 10E9/L (ref 0.8–5.3)
LYMPHOCYTES NFR BLD AUTO: 13.1 %
MCH RBC QN AUTO: 31.6 PG (ref 26.5–33)
MCHC RBC AUTO-ENTMCNC: 32.5 G/DL (ref 31.5–36.5)
MCV RBC AUTO: 97 FL (ref 78–100)
MONOCYTES # BLD AUTO: 0.8 10E9/L (ref 0–1.3)
MONOCYTES NFR BLD AUTO: 8.9 %
NEUTROPHILS # BLD AUTO: 7 10E9/L (ref 1.6–8.3)
NEUTROPHILS NFR BLD AUTO: 75.9 %
PLATELET # BLD AUTO: 291 10E9/L (ref 150–450)
POTASSIUM SERPL-SCNC: 3.7 MMOL/L (ref 3.5–5.1)
PROT SERPL-MCNC: 7.7 G/DL (ref 6.4–8.9)
RBC # BLD AUTO: 4.69 10E12/L (ref 3.8–5.2)
SODIUM SERPL-SCNC: 140 MMOL/L (ref 134–144)
WBC # BLD AUTO: 9.3 10E9/L (ref 4–11)

## 2019-09-26 PROCEDURE — 99205 OFFICE O/P NEW HI 60 MIN: CPT | Performed by: INTERNAL MEDICINE

## 2019-09-26 PROCEDURE — 85025 COMPLETE CBC W/AUTO DIFF WBC: CPT | Mod: ZL | Performed by: INTERNAL MEDICINE

## 2019-09-26 PROCEDURE — 36415 COLL VENOUS BLD VENIPUNCTURE: CPT | Mod: ZL | Performed by: INTERNAL MEDICINE

## 2019-09-26 PROCEDURE — 86300 IMMUNOASSAY TUMOR CA 15-3: CPT | Mod: ZL | Performed by: INTERNAL MEDICINE

## 2019-09-26 PROCEDURE — 83615 LACTATE (LD) (LDH) ENZYME: CPT | Mod: ZL | Performed by: INTERNAL MEDICINE

## 2019-09-26 PROCEDURE — 80053 COMPREHEN METABOLIC PANEL: CPT | Mod: ZL | Performed by: INTERNAL MEDICINE

## 2019-09-26 RX ORDER — AZITHROMYCIN 250 MG/1
TABLET, FILM COATED ORAL
Qty: 6 TABLET | Refills: 0 | Status: SHIPPED | OUTPATIENT
Start: 2019-09-26 | End: 2019-10-24

## 2019-09-26 RX ORDER — ANASTROZOLE 1 MG/1
1 TABLET ORAL DAILY
Qty: 90 TABLET | Refills: 3 | Status: SHIPPED | OUTPATIENT
Start: 2019-09-26 | End: 2020-09-02

## 2019-09-26 ASSESSMENT — PAIN SCALES - GENERAL: PAINLEVEL: NO PAIN (0)

## 2019-09-26 ASSESSMENT — MIFFLIN-ST. JEOR: SCORE: 984.33

## 2019-09-26 NOTE — NURSING NOTE
"Chief Complaint   Patient presents with     Consult     Left breast cancer   No current concerns or complaints.     Initial /68   Pulse 64   Temp 98.1  F (36.7  C) (Oral)   Resp 16   Ht 1.543 m (5' 0.75\")   Wt 62.6 kg (138 lb)   SpO2 93%   BMI 26.29 kg/m   Estimated body mass index is 26.29 kg/m  as calculated from the following:    Height as of this encounter: 1.543 m (5' 0.75\").    Weight as of this encounter: 62.6 kg (138 lb).  Medication Reconciliation: complete    Samantha Singh, CMA  "

## 2019-09-27 LAB — CANCER AG27-29 SERPL-ACNC: 33 U/ML (ref 0–39)

## 2019-09-27 NOTE — PROGRESS NOTES
Visit Date:   09/26/2019      REASON FOR CONSULTATION:  Invasive ductal carcinoma of the left  breast.      REQUESTING PHYSICIANS:  Dr. Hilario and Dr. Villagran      HISTORY OF PRESENT ILLNESS:  Mrs. Matos is an 89-year-old white female with severe dementia, end-stage hypertension, chronic atrial fibrillation, whom we are asked to evaluate concerning new diagnosis of invasive ductal carcinoma of the left breast.  Apparently, the patient had palpated a mass and subsequently underwent a mammogram ultrasound performed on 08/23/2019, which revealed there was irregular hypoechoic mass with posterior shadowing correlating with the palpable finding that measured at least 3.4 x 2.8 x 3.5 cm.  The patient underwent ultrasound-guided biopsy of the breast mass, performed on 09/03/2019, and the findings were the patient had a 3/3, Salem score, 9/9 invasive ductal carcinoma of the left breast.  There was perineural invasion seen.  Estrogen receptor was positive at 98%, progestin receptor was positive at 70%, HER-2/tc by FISH was negative.  The patient was seen by Dr. Niurka Hilario of General Surgery, and given the tumor size, age, significant medical issues, the patient was not deemed a candidate for surgery and that she may be a candidate for hormonal therapy.  The patient is seen today for consideration of antiestrogen therapy.  The patient is demented and cannot give a history, is not oriented to person, place or time.  She is nonambulatory.  She lives with her .  They winter in [place], with a plan to move in about a month for the winter.  Basically, the history is obtained from the .  In terms of history, there was no family history of breast cancer.  She was menopausal at age 50 and was on hormone replacement therapy for a period of time.  Otherwise, her performance status is 2.  She is not a candidate for chemotherapy at this point or neoadjuvant chemotherapy.  The family does not want to be overly  aggressive in terms of staging procedures.        PAST MEDICAL HISTORY:  Again, she cannot give us a history.  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      ALLERGIES:  SHE HAS NO KNOWN DRUG ALLERGIES.      MEDICATIONS:   1.  Multivitamins 1 tablet daily.   2.  Vitamin B12 1 tablet daily.   3.  Demadex 20 mg daily.   4.  K-Dur 10 mEq daily.   5.  Lisinopril 5 mg daily.   6.  Diltiazem  mg daily.   7.  Eliquis 5 mg p.o. b.i.d.      SOCIAL HISTORY:  Tobacco is negative.  Alcohol is negative.  She worked as a  in the  division at [employer] most of her life.  She currently lives with her .  She has end-stage dementia.      FAMILY HISTORY:  Significant for a sister with pancreatic cancer, brother with non-Hodgkin's lymphoma.      REVIEW OF SYSTEMS:  Cannot be obtained.  The patient is demented and cannot give a history.      PHYSICAL EXAMINATION:   GENERAL:  She is a demented elderly white female, sitting in a wheelchair.  She is not oriented to person, place or time.  ECOG performance status is a 2.     HEENT:  Oropharynx is nonerythematous.   NECK:  Supple.   LUNGS:  Clear to auscultation and percussion.   HEART:  Regular rhythm, S1, S2 normal.   BREASTS:  Exam deferred.   ABDOMEN:  Soft, nontender.   LYMPHATICS:  No cervical or supraclavicular nodes.   EXTREMITIES:  Trace ankle edema.   NEUROLOGIC:  Nonfocal.      LABORATORY DATA:  Laboratories reveal CBC that is within  normal limits.  BUN is 24, creatinine 1.0.  LDH is 172.      IMPRESSION AND PLAN:  Locally advanced, ER/OR, OR-positive, HER2-negative invasive ductal carcinoma of the left breast, grade 3.  The patient has multiple medical comorbidities, including end-stage dementia.  She is not a candidate for surgery or neoadjuvant chemotherapy.  We will offer her palliative antiestrogen therapy given her ER positivity, which may have some response in this tumor.  We would recommend aromatase inhibitor, Arimidex 1 mg p.o. daily.  We will obtain a tumor marker.  We offered the patient scans.  The patient's  refused.  We will see the patient in approximately 6 weeks to see how she is responding to the Arimidex.  We discussed side effects including hot flashes, risk of osteoporosis and recommended she be on vitamin D/calcium.  She also had a productive cough and was diagnosed with bronchitis and we are treating her empirically with a Z-KATHERIN 500 mg day #1, then 250 mg on days 2-5.  She will follow up with primary care physician otherwise.      Seventy minutes was spent with patient, greater than half the time spent in counseling.         MARK BAUER MD             D: 2019   T: 2019   MT: ROYCE      Name:     FELICIANO GILES   MRN:      [id number]        Account:      [id number]   :      10/31/1929           Visit Date:   2019      Document: [id number]       cc: Adam Hilario MD

## 2019-10-02 ENCOUNTER — TELEPHONE (OUTPATIENT)
Dept: ONCOLOGY | Facility: OTHER | Age: 84
End: 2019-10-02

## 2019-10-02 NOTE — TELEPHONE ENCOUNTER
Called with lab results. Patient has started Arimidex and will follow up with India Hart NP in 6 weeks to evaluate how she is tolerating. Patient also was started on Zpak at last oncology visit and  states she is feeling much better.  Kate Leonardo RN...........10/2/2019 3:54 PM

## 2019-10-11 ENCOUNTER — TELEPHONE (OUTPATIENT)
Dept: INTERNAL MEDICINE | Facility: OTHER | Age: 84
End: 2019-10-11

## 2019-10-11 NOTE — TELEPHONE ENCOUNTER
Okay for hospice.      She has locally advanced breast cancer and given her end-stage hypertension, advanced dementia, she is not a candidate for surgery or chemotherapy.    Adam Villagran MD

## 2019-10-11 NOTE — TELEPHONE ENCOUNTER
Called and spoke with Candie at [facility]. Candie is looking to see if Adam Villagran MD okay and order to proceeded with hospice. Family is wanting hospice, but Candie states that she does not think patient is eligible at this time. Please advise.     Thao [place] LPN............. October 11, 2019 2:23 PM

## 2019-10-14 ENCOUNTER — ALLIED HEALTH/NURSE VISIT (OUTPATIENT)
Dept: FAMILY MEDICINE | Facility: OTHER | Age: 84
End: 2019-10-14
Attending: INTERNAL MEDICINE
Payer: COMMERCIAL

## 2019-10-14 DIAGNOSIS — Z23 NEED FOR PROPHYLACTIC VACCINATION AND INOCULATION AGAINST INFLUENZA: Primary | ICD-10-CM

## 2019-10-14 PROCEDURE — 90471 IMMUNIZATION ADMIN: CPT

## 2019-10-14 PROCEDURE — 90662 IIV NO PRSV INCREASED AG IM: CPT

## 2019-10-14 NOTE — NURSING NOTE
Immunization Documentation    Prior to Immunization administration, verified patients identity using patient's name and date of birth. Please see IMMUNIZATIONS  and order for additional information.  Patient / Parent instructed to remain in clinic for 15 minutes and report any adverse reaction to staff immediately.    Was entire vial of medication used? Yes  Vial/Syringe: Kalli Handy LPN  10/14/2019   12:37 PM

## 2019-10-14 NOTE — TELEPHONE ENCOUNTER
Spoke to Candie. Relayed message. Faxed over a copy of orders.  Candie Klein LPN on 10/14/2019 at 9:40 AM

## 2019-10-24 ENCOUNTER — ONCOLOGY VISIT (OUTPATIENT)
Dept: ONCOLOGY | Facility: OTHER | Age: 84
End: 2019-10-24
Attending: INTERNAL MEDICINE
Payer: COMMERCIAL

## 2019-10-24 VITALS
TEMPERATURE: 98 F | HEART RATE: 79 BPM | SYSTOLIC BLOOD PRESSURE: 110 MMHG | HEIGHT: 60 IN | RESPIRATION RATE: 16 BRPM | BODY MASS INDEX: 27.34 KG/M2 | OXYGEN SATURATION: 95 % | DIASTOLIC BLOOD PRESSURE: 60 MMHG

## 2019-10-24 DIAGNOSIS — Z17.0 MALIGNANT NEOPLASM OF CENTRAL PORTION OF LEFT BREAST IN FEMALE, ESTROGEN RECEPTOR POSITIVE (H): Primary | ICD-10-CM

## 2019-10-24 DIAGNOSIS — C50.112 MALIGNANT NEOPLASM OF CENTRAL PORTION OF LEFT BREAST IN FEMALE, ESTROGEN RECEPTOR POSITIVE (H): Primary | ICD-10-CM

## 2019-10-24 PROCEDURE — 99215 OFFICE O/P EST HI 40 MIN: CPT | Performed by: INTERNAL MEDICINE

## 2019-10-24 ASSESSMENT — PAIN SCALES - GENERAL: PAINLEVEL: NO PAIN (0)

## 2019-10-24 NOTE — PROGRESS NOTES
Visit Date:   10/24/2019      HISTORY OF PRESENT ILLNESS:  Mrs. Matos returns for followup of invasive ductal carcinoma of the left breast.  We had seen the patient in consultation at the request of Dr. Hilario and Dr. Villagran on 12/26/2019.  At that time, Mrs. Matos was an 89-year-old white female with severe dementia, end-stage hypertension, chronic atrial fibrillation, we were asked to evaluate concerning diagnosis of invasive ductal carcinoma of the left breast.  Apparently, the patient had palpated a mass.  Subsequently, underwent a mammogram and ultrasound which was performed on 8/23/2019 which revealed there was an irregular hypoechoic mass with posterior shadowing correlating with the palpable finding that measured at least 3.4 x 2.8x3.5 cm.  The patient underwent ultrasound-guided biopsy of the breast mass performed on 9/3/2019 and the findings were the patient had 3/3 Highland score 09/09, invasive ductal carcinoma of the left breast.  There was perineural invasion seen.  Estrogen receptor was positive at 98%, progesterone receptor was positive at 70%, HER-2/tc by FISH was negative.  The patient was seen by Dr. Niurka Hilario of General Surgery.  Given her tumor size and significant medical issues, the patient was not deemed a candidate for surgery, but it was felt that she could be a candidate for hormonal therapy.  The patient was demented and could not give us a history.  History was obtained from the , but in terms of history, there was no family history of breast cancer.  She was menopausal at age 50 and was on hormone replacement therapy for a period of time.  Otherwise, her performance status was 2.  She was not a candidate for chemotherapy at this point or neoadjuvant chemotherapy and the family did not want to be overly aggressive in terms of staging procedures.  We felt, given the fact she was ER positive, that she would be a candidate for Arimidex or anastrozole.  We started the patient  on Arimidex 1 mg p.o. daily.  Again, the patient refused scans or blood work.  The patient comes in today.  According to the , she is tolerating Arimidex well without any significant hot flashes or complaints of bone pain.  Otherwise, the patient is demented and cannot give a history.  They plan to be moving to [place] until April where they will establish care with a local oncologist.      PHYSICAL EXAMINATION:   GENERAL:  She is an 89-year-old demented white female in no acute distress.   VITAL SIGNS:  Blood pressure 110/60, pulse 79, respirations 16, temperature 98.   HEENT:  Atraumatic, normocephalic.  Oropharynx nonerythematous.   NECK:  Supple, no thyromegaly.   LUNGS:  Clear to auscultation and percussion.   HEART:  Regular rhythm, S1, S2 normal.   BREASTS:  Exam was deferred.   ABDOMEN:  Soft, nontender.   LYMPHATICS:  No cervical, supraclavicular nodes.   EXTREMITIES:  Trace ankle edema.   NEUROLOGIC:  Significant for dementia.  The patient is oriented to person.      LABORATORY DATA:  Reveals CA 27.2 133.  CBC is within normal limits.  BUN is 24, creatinine 1.  LFTs are normal.  LDH is 172.      IMPRESSION:  Locally advanced, ER/CO positive, HER-2/tc negative ductal carcinoma of the left breast, grade 3.  The patient had multiple medical comorbidities including end-stage dementia.  She was not a candidate for surgery or neoadjuvant chemotherapy.  The patient was offered palliative antiestrogen therapy.  The family refused staging studies.  The patient was started on Arimidex 1 mg p.o. daily, tolerating it reasonably well.  Tumor markers normal.      PLAN:  The plan is  to have the patient follow up with us when they return from [place] in May.  We will obtain CBC, CMP, LDH, CA 27-29.  She will establish locally with a local oncologist in [place].      Forty minutes was spent with the patient.         MARK BAUER MD             D: 10/24/2019   T: 10/24/2019   MT:       Name:     CHAN FELICIANO    MRN:      [id number]        Account:      [id number]   :      10/31/1929           Visit Date:   10/24/2019      Document: [id number]

## 2019-10-24 NOTE — NURSING NOTE
"Chief Complaint   Patient presents with     RECHECK     L Breast Cancer       Initial /60 (BP Location: Right arm, Patient Position: Sitting, Cuff Size: Adult Regular)   Pulse 79   Temp 98  F (36.7  C) (Tympanic)   Resp 16   Ht 1.513 m (4' 11.57\")   SpO2 95%   Breastfeeding? No   BMI 27.34 kg/m   Estimated body mass index is 27.34 kg/m  as calculated from the following:    Height as of this encounter: 1.513 m (4' 11.57\").    Weight as of 9/26/19: 62.6 kg (138 lb).  Medication Reconciliation: complete  Shannen Oneal LPN  "

## 2019-12-04 DIAGNOSIS — C50.112 MALIGNANT NEOPLASM OF CENTRAL PORTION OF LEFT BREAST IN FEMALE, ESTROGEN RECEPTOR POSITIVE (H): Primary | ICD-10-CM

## 2019-12-04 DIAGNOSIS — Z17.0 MALIGNANT NEOPLASM OF CENTRAL PORTION OF LEFT BREAST IN FEMALE, ESTROGEN RECEPTOR POSITIVE (H): Primary | ICD-10-CM

## 2020-08-26 DIAGNOSIS — I10 BENIGN ESSENTIAL HYPERTENSION: Primary | ICD-10-CM

## 2020-08-26 NOTE — LETTER
August 27, 2020      Anne Matos  [address] [place] [address]        Dear Anne,           A refill of potassium chloride ER  has been requested by your pharmacy and we noticed that you are overdue for an annual exam.      This refill request has been sent to your provider for consideration at this time.    Your health is very important to us.  Please call the clinic at [phone] to schedule your appointment.    Thank you for choosing [facility] needs.    Sincerely,    Refill RN  [facility]

## 2020-08-27 RX ORDER — POTASSIUM CHLORIDE 750 MG/1
TABLET, EXTENDED RELEASE ORAL
Qty: 180 TABLET | Refills: 3 | Status: SHIPPED | OUTPATIENT
Start: 2020-08-27 | End: 2021-01-01

## 2020-08-27 NOTE — TELEPHONE ENCOUNTER
sent Rx request for the following:   potassium chloride ER (K-DUR/KLOR-CON M) 10 MEQ CR tablet  Sig: Take 1 tablet (10 mEq) by mouth 2 times daily - Oral     Last Prescription Date:   08/20/2019  Last Fill Qty/Refills:         180, R-3    Last Office Visit:              08/20/2019   Future Office visit:           none  Routing refill request to provider for review/approval because:  Patient needs to be seen because it has been more than 1 year since last office visit.    Unable to complete prescription refill per RN Medication Refill Policy. Pt is due for medication management appt.  Will send appt reminder letter, add note to Rx, [place] up meds, and route to provider for consideration.      ................... Candie Jerry RN ....................  8/27/2020   8:17 AM

## 2020-09-01 ENCOUNTER — HOSPITAL ENCOUNTER (EMERGENCY)
Facility: OTHER | Age: 85
Discharge: HOME | End: 2020-09-01
Attending: PHYSICIAN ASSISTANT | Admitting: PHYSICIAN ASSISTANT
Payer: COMMERCIAL

## 2020-09-01 ENCOUNTER — NURSE TRIAGE (OUTPATIENT)
Dept: INTERNAL MEDICINE | Facility: OTHER | Age: 85
End: 2020-09-01

## 2020-09-01 ENCOUNTER — APPOINTMENT (OUTPATIENT)
Dept: GENERAL RADIOLOGY | Facility: OTHER | Age: 85
End: 2020-09-01
Attending: PHYSICIAN ASSISTANT
Payer: COMMERCIAL

## 2020-09-01 VITALS
HEIGHT: 62 IN | BODY MASS INDEX: 23.55 KG/M2 | RESPIRATION RATE: 27 BRPM | HEART RATE: 81 BPM | WEIGHT: 128 LBS | TEMPERATURE: 98.8 F | SYSTOLIC BLOOD PRESSURE: 147 MMHG | OXYGEN SATURATION: 98 % | DIASTOLIC BLOOD PRESSURE: 70 MMHG

## 2020-09-01 DIAGNOSIS — N39.0 URINARY TRACT INFECTION WITH PYURIA: ICD-10-CM

## 2020-09-01 DIAGNOSIS — N39.0 SEPSIS SECONDARY TO UTI (H): ICD-10-CM

## 2020-09-01 DIAGNOSIS — E86.0 DEHYDRATION: ICD-10-CM

## 2020-09-01 DIAGNOSIS — A41.9 SEPSIS SECONDARY TO UTI (H): ICD-10-CM

## 2020-09-01 DIAGNOSIS — R63.0 DECREASED APPETITE: ICD-10-CM

## 2020-09-01 DIAGNOSIS — M62.81 GENERALIZED MUSCLE WEAKNESS: ICD-10-CM

## 2020-09-01 LAB
ALBUMIN SERPL-MCNC: 3.7 G/DL (ref 3.5–5.7)
ALBUMIN UR-MCNC: 10 MG/DL
ALP SERPL-CCNC: 77 U/L (ref 34–104)
ALT SERPL W P-5'-P-CCNC: 9 U/L (ref 7–52)
ANION GAP SERPL CALCULATED.3IONS-SCNC: 8 MMOL/L (ref 3–14)
APPEARANCE UR: ABNORMAL
AST SERPL W P-5'-P-CCNC: 14 U/L (ref 13–39)
BACTERIA #/AREA URNS HPF: ABNORMAL /HPF
BASOPHILS # BLD AUTO: 0 10E9/L (ref 0–0.2)
BASOPHILS NFR BLD AUTO: 0.3 %
BILIRUB SERPL-MCNC: 0.7 MG/DL (ref 0.3–1)
BILIRUB UR QL STRIP: NEGATIVE
BUN SERPL-MCNC: 17 MG/DL (ref 7–25)
CALCIUM SERPL-MCNC: 10 MG/DL (ref 8.6–10.3)
CHLORIDE SERPL-SCNC: 101 MMOL/L (ref 98–107)
CO2 SERPL-SCNC: 33 MMOL/L (ref 21–31)
COLOR UR AUTO: YELLOW
CREAT SERPL-MCNC: 0.92 MG/DL (ref 0.6–1.2)
CRP SERPL-MCNC: 29.3 MG/L
DIFFERENTIAL METHOD BLD: ABNORMAL
EOSINOPHIL # BLD AUTO: 0.1 10E9/L (ref 0–0.7)
EOSINOPHIL NFR BLD AUTO: 0.5 %
ERYTHROCYTE [DISTWIDTH] IN BLOOD BY AUTOMATED COUNT: 12.1 % (ref 10–15)
GFR SERPL CREATININE-BSD FRML MDRD: 57 ML/MIN/{1.73_M2}
GLUCOSE SERPL-MCNC: 142 MG/DL (ref 70–105)
GLUCOSE UR STRIP-MCNC: NEGATIVE MG/DL
HCT VFR BLD AUTO: 43.5 % (ref 35–47)
HGB BLD-MCNC: 14.4 G/DL (ref 11.7–15.7)
HGB UR QL STRIP: ABNORMAL
HYALINE CASTS #/AREA URNS LPF: 2 /LPF (ref 0–2)
IMM GRANULOCYTES # BLD: 0 10E9/L (ref 0–0.4)
IMM GRANULOCYTES NFR BLD: 0.3 %
INR PPP: 1.94 (ref 0–1.3)
KETONES UR STRIP-MCNC: NEGATIVE MG/DL
LACTATE BLD-SCNC: 1.9 MMOL/L (ref 0.7–2)
LACTATE BLD-SCNC: 2.6 MMOL/L (ref 0.7–2)
LEUKOCYTE ESTERASE UR QL STRIP: ABNORMAL
LYMPHOCYTES # BLD AUTO: 0.9 10E9/L (ref 0.8–5.3)
LYMPHOCYTES NFR BLD AUTO: 7.7 %
MAGNESIUM SERPL-MCNC: 2 MG/DL (ref 1.9–2.7)
MCH RBC QN AUTO: 31.6 PG (ref 26.5–33)
MCHC RBC AUTO-ENTMCNC: 33.1 G/DL (ref 31.5–36.5)
MCV RBC AUTO: 96 FL (ref 78–100)
MONOCYTES # BLD AUTO: 0.9 10E9/L (ref 0–1.3)
MONOCYTES NFR BLD AUTO: 7.6 %
MUCOUS THREADS #/AREA URNS LPF: PRESENT /LPF
NEUTROPHILS # BLD AUTO: 9.9 10E9/L (ref 1.6–8.3)
NEUTROPHILS NFR BLD AUTO: 83.6 %
NITRATE UR QL: NEGATIVE
NT-PROBNP SERPL-MCNC: 216 PG/ML (ref 0–100)
PH UR STRIP: 6.5 PH (ref 5–7)
PLATELET # BLD AUTO: 270 10E9/L (ref 150–450)
POTASSIUM SERPL-SCNC: 3.7 MMOL/L (ref 3.5–5.1)
PROT SERPL-MCNC: 7.4 G/DL (ref 6.4–8.9)
RBC # BLD AUTO: 4.55 10E12/L (ref 3.8–5.2)
RBC #/AREA URNS AUTO: 6 /HPF (ref 0–2)
SODIUM SERPL-SCNC: 142 MMOL/L (ref 134–144)
SOURCE: ABNORMAL
SP GR UR STRIP: 1.01 (ref 1–1.03)
SQUAMOUS #/AREA URNS AUTO: 4 /HPF (ref 0–1)
TROPONIN I SERPL-MCNC: 19 PG/ML
UROBILINOGEN UR STRIP-MCNC: NORMAL MG/DL (ref 0–2)
WBC # BLD AUTO: 11.9 10E9/L (ref 4–11)
WBC #/AREA URNS AUTO: 68 /HPF (ref 0–5)
WBC CLUMPS #/AREA URNS HPF: PRESENT /HPF
YEAST #/AREA URNS HPF: ABNORMAL /HPF

## 2020-09-01 PROCEDURE — 96365 THER/PROPH/DIAG IV INF INIT: CPT | Performed by: PHYSICIAN ASSISTANT

## 2020-09-01 PROCEDURE — 99285 EMERGENCY DEPT VISIT HI MDM: CPT | Mod: 25 | Performed by: PHYSICIAN ASSISTANT

## 2020-09-01 PROCEDURE — 83735 ASSAY OF MAGNESIUM: CPT | Performed by: PHYSICIAN ASSISTANT

## 2020-09-01 PROCEDURE — 93005 ELECTROCARDIOGRAM TRACING: CPT | Performed by: PHYSICIAN ASSISTANT

## 2020-09-01 PROCEDURE — 93010 ELECTROCARDIOGRAM REPORT: CPT | Performed by: INTERNAL MEDICINE

## 2020-09-01 PROCEDURE — 83880 ASSAY OF NATRIURETIC PEPTIDE: CPT | Performed by: PHYSICIAN ASSISTANT

## 2020-09-01 PROCEDURE — 85610 PROTHROMBIN TIME: CPT | Performed by: PHYSICIAN ASSISTANT

## 2020-09-01 PROCEDURE — 80053 COMPREHEN METABOLIC PANEL: CPT | Performed by: PHYSICIAN ASSISTANT

## 2020-09-01 PROCEDURE — 86140 C-REACTIVE PROTEIN: CPT | Performed by: PHYSICIAN ASSISTANT

## 2020-09-01 PROCEDURE — 85025 COMPLETE CBC W/AUTO DIFF WBC: CPT | Performed by: PHYSICIAN ASSISTANT

## 2020-09-01 PROCEDURE — 99284 EMERGENCY DEPT VISIT MOD MDM: CPT | Mod: Z6 | Performed by: PHYSICIAN ASSISTANT

## 2020-09-01 PROCEDURE — 71045 X-RAY EXAM CHEST 1 VIEW: CPT

## 2020-09-01 PROCEDURE — 25800030 ZZH RX IP 258 OP 636: Performed by: PHYSICIAN ASSISTANT

## 2020-09-01 PROCEDURE — 81001 URINALYSIS AUTO W/SCOPE: CPT | Performed by: PHYSICIAN ASSISTANT

## 2020-09-01 PROCEDURE — 36415 COLL VENOUS BLD VENIPUNCTURE: CPT | Performed by: PHYSICIAN ASSISTANT

## 2020-09-01 PROCEDURE — 74018 RADEX ABDOMEN 1 VIEW: CPT

## 2020-09-01 PROCEDURE — 25000128 H RX IP 250 OP 636: Performed by: PHYSICIAN ASSISTANT

## 2020-09-01 PROCEDURE — 96361 HYDRATE IV INFUSION ADD-ON: CPT | Performed by: PHYSICIAN ASSISTANT

## 2020-09-01 PROCEDURE — 83605 ASSAY OF LACTIC ACID: CPT | Performed by: PHYSICIAN ASSISTANT

## 2020-09-01 PROCEDURE — 84484 ASSAY OF TROPONIN QUANT: CPT | Performed by: PHYSICIAN ASSISTANT

## 2020-09-01 RX ORDER — SULFAMETHOXAZOLE/TRIMETHOPRIM 800-160 MG
1 TABLET ORAL 2 TIMES DAILY
Qty: 20 TABLET | Refills: 0 | Status: SHIPPED | OUTPATIENT
Start: 2020-09-01 | End: 2020-09-11

## 2020-09-01 RX ORDER — SODIUM CHLORIDE 9 MG/ML
INJECTION, SOLUTION INTRAVENOUS CONTINUOUS
Status: DISCONTINUED | OUTPATIENT
Start: 2020-09-01 | End: 2020-09-01 | Stop reason: HOSPADM

## 2020-09-01 RX ORDER — CEFTRIAXONE SODIUM 1 G/50ML
1 INJECTION, SOLUTION INTRAVENOUS ONCE
Status: COMPLETED | OUTPATIENT
Start: 2020-09-01 | End: 2020-09-01

## 2020-09-01 RX ADMIN — SODIUM CHLORIDE 1000 ML: 9 INJECTION, SOLUTION INTRAVENOUS at 16:38

## 2020-09-01 RX ADMIN — CEFTRIAXONE SODIUM 1 G: 1 INJECTION, SOLUTION INTRAVENOUS at 19:11

## 2020-09-01 RX ADMIN — SODIUM CHLORIDE 500 ML: 9 INJECTION, SOLUTION INTRAVENOUS at 18:00

## 2020-09-01 RX ADMIN — SODIUM CHLORIDE: 9 INJECTION, SOLUTION INTRAVENOUS at 19:11

## 2020-09-01 ASSESSMENT — ENCOUNTER SYMPTOMS
ABDOMINAL PAIN: 0
BACK PAIN: 0
CHILLS: 0
SHORTNESS OF BREATH: 0
WEAKNESS: 1
BRUISES/BLEEDS EASILY: 0
CHEST TIGHTNESS: 0
ACTIVITY CHANGE: 1
CONFUSION: 1
APPETITE CHANGE: 1
ADENOPATHY: 0
FATIGUE: 1
FEVER: 0
WOUND: 0
HEMATURIA: 0

## 2020-09-01 ASSESSMENT — MIFFLIN-ST. JEOR: SCORE: 953.85

## 2020-09-01 NOTE — TELEPHONE ENCOUNTER
Phone call to pt, Isabela's spouse, Stan. Stan reported he is concerned about his wife's current medical condition. He reported for the last 7-10 days Isabela has been less coherent, not urinating much, not drinking much and sleeping >20 hour per day. Isabela is currently still taking all of her daily medications with the assistance of Stan. Pts spouse advised to bring Isabela into the ED so she can be seen by a provider. The patient's spouse, Stan, indicates understanding of these issues and agrees with the plan. Stan will bring all of Kartiks medications with to the visit. Will route to PCP as an FYI.    Candie Jerry RN  ....................  9/1/2020   1:42 PM      Reason for Disposition    Nursing judgment    Protocols used: NO PROTOCOL AVAILABLE - SICK ADULT-A-[place]

## 2020-09-01 NOTE — ED PROVIDER NOTES
History     Chief Complaint   Patient presents with     Dehydration     increased confusion     HPI  Anne Matos is a 89 year old female who resides at home with the assistance of her family.  Her family reports that seizures seem to be much more weaker and confused than usual.  She has not been eating or drinking much over the past few weeks.  Family is concerned that she may be dehydrated.  No diarrhea or constipation.  No cough, sore throat or flulike symptoms.  No chest pain or shortness of breath.  No nausea or vomiting.  No fever or chills.    Allergies:  No Known Allergies    Problem List:    Patient Active Problem List    Diagnosis Date Noted     Poor mobility 08/20/2019     Priority: Medium     Large mass of left breast 08/20/2019     Priority: Medium     Dysthymic disorder 01/19/2018     Priority: Medium     Mixed hyperlipidemia 01/19/2018     Priority: Medium     Benign essential hypertension 01/19/2018     Priority: Medium     History of shingles 08/09/2017     Priority: Medium     Personal history of ongoing treatment with alendronate (Fosamax) 08/09/2017     Priority: Medium     Chronic anticoagulation 06/22/2016     Priority: Medium     History of peptic ulcer 06/22/2016     Priority: Medium     DDD (degenerative disc disease), cervical 06/11/2016     Priority: Medium     Hypomagnesemia 06/11/2016     Priority: Medium     Hyponatremia 06/11/2016     Priority: Medium     Neck pain 06/11/2016     Priority: Medium     Osteoporosis 06/11/2016     Priority: Medium     Chronic atrial fibrillation (H) 10/16/2015     Priority: Medium     Biatrial enlargement 10/03/2014     Priority: Medium     Acute on chronic diastolic heart failure (H) 10/03/2014     Priority: Medium     LVH (left ventricular hypertrophy) 10/03/2014     Priority: Medium     Pulmonary hypertension, moderate to severe (H) 10/03/2014     Priority: Medium     Collapse of lumbar vertebra due to osteoporosis (H) 10/02/2014     Priority:  Medium     History of recurrent TIAs 10/02/2014     Priority: Medium     Vascular dementia without behavioral disturbance (H) 10/02/2014     Priority: Medium        Past Medical History:    Past Medical History:   Diagnosis Date     Acute stress reaction      Age-related osteoporosis without current pathological fracture      Atrial fibrillation (H)      Carcinoma in situ      Dementia without behavioral disturbance (H)      Essential (primary) hypertension      Gastric ulcer without hemorrhage or perforation        Past Surgical History:    Past Surgical History:   Procedure Laterality Date     APPENDECTOMY OPEN           COLONOSCOPY      04     EXTRACAPSULAR CATARACT EXTRATION WITH INTRAOCULAR LENS IMPLANT      , ,bilateral     HEMORRHOID SURGERY      1960     HYSTERECTOMY TOTAL ABDOMINAL           OTHER SURGICAL HISTORY      3/2012,2070,CARDIOVERSION,of fibrillation in [place]     OTHER SURGICAL HISTORY      70,2085,CRYOTHERAPY OF CERVIX,Cervical conization     OTHER SURGICAL HISTORY      70,2085,CRYOTHERAPY OF CERVIX,Cervical cauterization       Family History:    Family History   Problem Relation Age of Onset     Other - See Comments Mother         Stroke     Cancer Father         Cancer,liver     Cancer Sister         Cancer,pancreatic     Cancer Brother         Cancer,lung     Other - See Comments Other         Stroke,stroke     Other - See Comments Brother          in the war     Other - See Comments Daughter         MS     Other - See Comments Other         10 total, 1 still alive       Social History:  Marital Status:   [2]  Social History     Tobacco Use     Smoking status: Former Smoker     Packs/day: 0.50     Years: 20.00     Pack years: 10.00     Types: Cigarettes     Last attempt to quit: 1981     Years since quittin.6     Smokeless tobacco: Never Used   Substance Use Topics     Alcohol use: Yes     Alcohol/week: 0.0 standard drinks      "Comment: 3-4 times a week drinks 3 beers     Drug use: No        Medications:    anastrozole (ARIMIDEX) 1 MG tablet  calcium carbonate-vitamin D (OSCAL W/D) 500-200 MG-UNIT tablet  diltiazem ER COATED BEADS (CARTIA XT) 120 MG 24 hr capsule  ELIQUIS ANTICOAGULANT 5 MG tablet  Multiple Vitamin (MULTI-VITAMINS) TABS  potassium chloride ER (K-DUR/KLOR-CON M) 10 MEQ CR tablet  torsemide (DEMADEX) 20 MG tablet  lisinopril (PRINIVIL/ZESTRIL) 5 MG tablet  order for DME  potassium chloride ER (K-TAB/KLOR-CON) 10 MEQ CR tablet          Review of Systems   Unable to perform ROS: Dementia   Constitutional: Positive for activity change, appetite change and fatigue. Negative for chills and fever.   HENT: Negative for congestion.    Eyes: Negative for visual disturbance.   Respiratory: Negative for chest tightness and shortness of breath.    Cardiovascular: Negative for chest pain.   Gastrointestinal: Negative for abdominal pain.   Genitourinary: Negative for hematuria.   Musculoskeletal: Negative for back pain.   Skin: Negative for rash and wound.   Neurological: Positive for weakness. Negative for syncope.   Hematological: Negative for adenopathy. Does not bruise/bleed easily.   Psychiatric/Behavioral: Positive for confusion.       Physical Exam   BP: 138/54  Pulse: 65  Temp: 98.8  F (37.1  C)  Resp: 14  Height: 157.5 cm (5' 2\")  Weight: 58.1 kg (128 lb)  SpO2: 97 %      Physical Exam  Constitutional:       General: She is not in acute distress.     Appearance: She is not ill-appearing, toxic-appearing or diaphoretic.   HENT:      Head: No raccoon eyes or Chao's sign.      Jaw: No trismus.      Right Ear: No drainage or tenderness.      Left Ear: No drainage or tenderness.      Nose: Nose normal.   Eyes:      General: No scleral icterus.     Extraocular Movements: Extraocular movements intact.      Right eye: Normal extraocular motion and no nystagmus.      Left eye: Normal extraocular motion and no nystagmus.      Pupils: " Pupils are equal, round, and reactive to light.      Right eye: Pupil is reactive and not sluggish.      Left eye: Pupil is reactive and not sluggish.      Funduscopic exam:     Right eye: No AV nicking, arteriolar narrowing or papilledema. Red reflex present.         Left eye: No AV nicking, arteriolar narrowing or papilledema. Red reflex present.  Neck:      Musculoskeletal: Normal range of motion. Normal range of motion. No neck rigidity, pain with movement, spinous process tenderness or muscular tenderness.      Vascular: No JVD.      Trachea: No tracheal deviation.   Cardiovascular:      Rate and Rhythm: Normal rate and regular rhythm.   Pulmonary:      Effort: Pulmonary effort is normal. No respiratory distress.      Breath sounds: Normal breath sounds. No stridor. No wheezing.      Comments: Lung sounds are clear but decreased throughout.  SaO2 is 97% on room air.  She does not appear to be in any respiratory distress.  No tachypnea.  Abdominal:      General: There is no distension.      Palpations: There is no mass.      Tenderness: There is no abdominal tenderness. There is no right CVA tenderness, left CVA tenderness, guarding or rebound.   Musculoskeletal: Normal range of motion.         General: No tenderness or deformity.   Lymphadenopathy:      Cervical: No cervical adenopathy.      Right cervical: No superficial cervical adenopathy.     Left cervical: No superficial cervical adenopathy.   Skin:     General: Skin is warm and dry.      Capillary Refill: Capillary refill takes less than 2 seconds.   Neurological:      Mental Status: She is alert and oriented to person, place, and time.      GCS: GCS eye subscore is 4. GCS verbal subscore is 5. GCS motor subscore is 6.      Motor: No tremor or seizure activity.      Coordination: Coordination normal.      Gait: Gait normal.         ED Course     EKG shows atrial fibrillation.  Inferior infarct, age undetermined.  Anterior infarct, age undetermined.  Heart  rate is 68.  This is essentially unchanged from previous EKG on 6/11/2016.  The Lactic acid level is elevated due to UTI and sepsis as well as dehydration, at this time there is no sign of severe sepsis or septic shock.         Results for orders placed or performed during the hospital encounter of 09/01/20 (from the past 24 hour(s))   Comprehensive metabolic panel   Result Value Ref Range    Sodium 142 134 - 144 mmol/L    Potassium 3.7 3.5 - 5.1 mmol/L    Chloride 101 98 - 107 mmol/L    Carbon Dioxide 33 (H) 21 - 31 mmol/L    Anion Gap 8 3 - 14 mmol/L    Glucose 142 (H) 70 - 105 mg/dL    Urea Nitrogen 17 7 - 25 mg/dL    Creatinine 0.92 0.60 - 1.20 mg/dL    GFR Estimate 57 (L) >60 mL/min/[1.73_m2]    GFR Estimate If Black 69 >60 mL/min/[1.73_m2]    Calcium 10.0 8.6 - 10.3 mg/dL    Bilirubin Total 0.7 0.3 - 1.0 mg/dL    Albumin 3.7 3.5 - 5.7 g/dL    Protein Total 7.4 6.4 - 8.9 g/dL    Alkaline Phosphatase 77 34 - 104 U/L    ALT 9 7 - 52 U/L    AST 14 13 - 39 U/L   CBC with platelets differential   Result Value Ref Range    WBC 11.9 (H) 4.0 - 11.0 10e9/L    RBC Count 4.55 3.8 - 5.2 10e12/L    Hemoglobin 14.4 11.7 - 15.7 g/dL    Hematocrit 43.5 35.0 - 47.0 %    MCV 96 78 - 100 fl    MCH 31.6 26.5 - 33.0 pg    MCHC 33.1 31.5 - 36.5 g/dL    RDW 12.1 10.0 - 15.0 %    Platelet Count 270 150 - 450 10e9/L    Diff Method Automated Method     % Neutrophils 83.6 %    % Lymphocytes 7.7 %    % Monocytes 7.6 %    % Eosinophils 0.5 %    % Basophils 0.3 %    % Immature Granulocytes 0.3 %    Absolute Neutrophil 9.9 (H) 1.6 - 8.3 10e9/L    Absolute Lymphocytes 0.9 0.8 - 5.3 10e9/L    Absolute Monocytes 0.9 0.0 - 1.3 10e9/L    Absolute Eosinophils 0.1 0.0 - 0.7 10e9/L    Absolute Basophils 0.0 0.0 - 0.2 10e9/L    Abs Immature Granulocytes 0.0 0 - 0.4 10e9/L   INR   Result Value Ref Range    INR 1.94 (H) 0 - 1.3   Lactic acid whole blood   Result Value Ref Range    Lactic Acid 2.6 (H) 0.7 - 2.0 mmol/L   Troponin GH   Result Value Ref  Range    Troponin 19.0 <34.0 pg/mL   Nt probnp inpatient (BNP)   Result Value Ref Range    N-Terminal Pro BNP Inpatient 216 (H) 0 - 100 pg/mL   CRP inflammation   Result Value Ref Range    CRP Inflammation 29.3 (H) <10.0 mg/L   Magnesium   Result Value Ref Range    Magnesium 2.0 1.9 - 2.7 mg/dL   EKG 12-lead, tracing only   Result Value Ref Range    Interpretation ECG Click View Image link to view waveform and result    UA reflex to Microscopic   Result Value Ref Range    Color Urine Yellow     Appearance Urine Slightly Cloudy     Glucose Urine Negative NEG^Negative mg/dL    Bilirubin Urine Negative NEG^Negative    Ketones Urine Negative NEG^Negative mg/dL    Specific Gravity Urine 1.013 1.003 - 1.035    Blood Urine Trace (A) NEG^Negative    pH Urine 6.5 5.0 - 7.0 pH    Protein Albumin Urine 10 (A) NEG^Negative mg/dL    Urobilinogen mg/dL Normal 0.0 - 2.0 mg/dL    Nitrite Urine Negative NEG^Negative    Leukocyte Esterase Urine Large (A) NEG^Negative    Source Midstream Urine     RBC Urine 6 (H) 0 - 2 /HPF    WBC Urine 68 (H) 0 - 5 /HPF    WBC Clumps Present (A) NEG^Negative /HPF    Bacteria Urine Few (A) NEG^Negative /HPF    Yeast Urine Few (A) NEG^Negative /HPF    Squamous Epithelial /HPF Urine 4 (H) 0 - 1 /HPF    Mucous Urine Present (A) NEG^Negative /LPF    Hyaline Casts 2 0 - 2 /LPF   XR Chest Port 1 View    Narrative    XR ABDOMEN PORT 1 VW, XR CHEST PORT 1 VW, 9/1/2020 7:15 PM    History: Female, age 89 years; weakness with decreased appetite    Comparison: Abdominal x-ray 6/11/2016, chest x-ray 4/26/2019    Technique: Single view of the chest and single view the abdomen are  submitted .    FINDINGS: Cardiac silhouette is enlarged and is partially obscured by  perihilar opacities. There is interstitial prominence throughout both  lungs. There is a small right-sided pleural effusion which is present  previously.    Single supine view the abdomen demonstrates extensive postoperative  changes in the upper  abdomen. Bowel gas pattern is unremarkable      Impression    IMPRESSION:   Chest x-ray suggests congestive heart failure with small right-sided  pleural effusion, likely chronic versus recurrent.    Abdominal x-ray demonstrates no evidence of obstruction.    MICHELLE WATTS MD   XR Abdomen Port 1 View    Narrative    XR ABDOMEN PORT 1 VW, XR CHEST PORT 1 VW, 9/1/2020 7:15 PM    History: Female, age 89 years; weakness with decreased appetite    Comparison: Abdominal x-ray 6/11/2016, chest x-ray 4/26/2019    Technique: Single view of the chest and single view the abdomen are  submitted .    FINDINGS: Cardiac silhouette is enlarged and is partially obscured by  perihilar opacities. There is interstitial prominence throughout both  lungs. There is a small right-sided pleural effusion which is present  previously.    Single supine view the abdomen demonstrates extensive postoperative  changes in the upper abdomen. Bowel gas pattern is unremarkable      Impression    IMPRESSION:   Chest x-ray suggests congestive heart failure with small right-sided  pleural effusion, likely chronic versus recurrent.    Abdominal x-ray demonstrates no evidence of obstruction.    MICHELLE WATTS MD   Lactic acid   Result Value Ref Range    Lactic Acid 1.9 0.7 - 2.0 mmol/L       Medications   sodium chloride 0.9% infusion ( Intravenous Stopped 9/1/20 1957)   0.9% sodium chloride BOLUS (0 mLs Intravenous Stopped 9/1/20 1800)   0.9% sodium chloride BOLUS (0 mLs Intravenous Stopped 9/1/20 1910)   cefTRIAXone in d5w (ROCEPHIN) intermittent infusion 1 g (0 g Intravenous Stopped 9/1/20 1957)       Assessments & Plan (with Medical Decision Making)     I have reviewed the nursing notes.    I have reviewed the findings, diagnosis, plan and need for follow up with the patient.      New Prescriptions    SULFAMETHOXAZOLE-TRIMETHOPRIM (BACTRIM DS) 800-160 MG TABLET    Take 1 tablet by mouth 2 times daily for 10 days       Final diagnoses:   Urinary  tract infection with pyuria   Sepsis secondary to UTI (H)   Generalized muscle weakness   Decreased appetite   Dehydration     Afebrile.  Vital signs stable.  Patient with increasing weakness and fatigue.  Family reports increasing confusion as well.  IV established and she was given fluids.  EKG shows atrial fibrillation.  Inferior infarct, age undetermined.  Anterior infarct, age undetermined.  Heart rate is 68.  This is essentially unchanged from previous EKG on 6/11/2016.  Troponin is normal.  INR is 1.94.  CBC shows an elevated white blood cells 11.9.  Her lactate is elevated as well at 2.6.  CMP shows a GFR of 57 but otherwise is unremarkable.  Her carbon dioxide is 33.  Chest x-ray suggests congestive heart failure with small right-sided pleural effusion, likely chronic versus recurrent.   Abdominal x-ray demonstrates no evidence of obstruction.  Patient with severe dehydration and took a long time to get a UA.  However UA shows a large amount of leukocyte Estrace.  68 white blood cells and few bacteria.  UC is pending.  Urinary tract with pyuria, sepsis, generalized muscle weakness, decreased appetite and dehydration.  She was given Rocephin IV.  Her lactate was rechecked and has improved 1.9.  I discussed with the patient hospitalization.  However our hospital is currently on divert.  The patient and her son do not want to transfer to a different facility.  I discussed close monitoring.  Rx for Bactrim which the patient will start tomorrow.  Patient will return if her symptoms continue tomorrow for possible hospitalization.  Follow-up sooner if there is any other concerns.  9/1/2020   [facility]     Venkata Wick PA-C  09/01/20 [id number]

## 2020-09-01 NOTE — ED TRIAGE NOTES
"ED Nursing Triage Note (General)   ________________________________    Anne Matos is a 89 year old Female that presents to triage private car  With history of  Not eating or drinkning as much the past few weeks and her spouse feels she may be getting dehydrated.  More confused than her normal dementia reported by spouse/SO  Significant symptoms had onset 2 week(s) ago.  /54   Pulse 65   Temp 98.8  F (37.1  C) (Tympanic)   Resp 14   Ht 1.575 m (5' 2\")   Wt 58.1 kg (128 lb)   SpO2 97%   Breastfeeding No   BMI 23.41 kg/m  t  Patient appears alert , in moderate distress., and cooperative behavior.    GCS Total = 14  Airway: intact  Breathing noted as Normal.  Circulation Normal  Skin normal  Action taken:  To room      PRE HOSPITAL PRIOR LIVING SITUATION Spouse  "

## 2020-09-01 NOTE — TELEPHONE ENCOUNTER
Patient was not accepted on Hospice because she was actively treating her cancer.  Patient placed with Adam Villagran MD tomorrow afternoon to discuss medications refills.    Spouse reports that he thinks she is dehydrated over the past several weeks.  Spouse informed this information will be passed along to Triage staff to help determine if patient needs to come into the ER.      Yashira Romero LPN 9/1/2020 1:23 PM

## 2020-09-01 NOTE — ED AVS SNAPSHOT
[facility]  [address]  [place] [address]  Phone:  [phone]  Fax:  [phone]                                    Anne Matos   MRN: [id number]    Department:  [facility]   Date of Visit:  9/1/2020           After Visit Summary Signature Page    I have received my discharge instructions, and my questions have been answered. I have discussed any challenges I see with this plan with the nurse or doctor.    ..........................................................................................................................................  Patient/Patient Representative Signature      ..........................................................................................................................................  Patient Representative Print Name and Relationship to Patient    ..................................................               ................................................  Date                                   Time    ..........................................................................................................................................  Reviewed by Signature/Title    ...................................................              ..............................................  Date                                               Time          22EPIC Rev 08/18

## 2020-09-01 NOTE — TELEPHONE ENCOUNTER
Arie called and is looking to bring Anne in 09.02.20 I did say there was not appointment and offered a later one. He asked if you would call [phone]    Thank You    Olivia Temple on 9/1/2020 at 10:43 AM

## 2020-09-02 ENCOUNTER — OFFICE VISIT (OUTPATIENT)
Dept: INTERNAL MEDICINE | Facility: OTHER | Age: 85
End: 2020-09-02
Attending: INTERNAL MEDICINE
Payer: COMMERCIAL

## 2020-09-02 VITALS
RESPIRATION RATE: 16 BRPM | SYSTOLIC BLOOD PRESSURE: 122 MMHG | TEMPERATURE: 98.7 F | OXYGEN SATURATION: 96 % | DIASTOLIC BLOOD PRESSURE: 70 MMHG | WEIGHT: 133.25 LBS | HEART RATE: 80 BPM | BODY MASS INDEX: 24.37 KG/M2

## 2020-09-02 DIAGNOSIS — N30.01 ACUTE CYSTITIS WITH HEMATURIA: ICD-10-CM

## 2020-09-02 DIAGNOSIS — Z17.0 MALIGNANT NEOPLASM OF CENTRAL PORTION OF LEFT BREAST IN FEMALE, ESTROGEN RECEPTOR POSITIVE (H): ICD-10-CM

## 2020-09-02 DIAGNOSIS — C50.112 MALIGNANT NEOPLASM OF CENTRAL PORTION OF LEFT BREAST IN FEMALE, ESTROGEN RECEPTOR POSITIVE (H): ICD-10-CM

## 2020-09-02 DIAGNOSIS — I10 BENIGN ESSENTIAL HYPERTENSION: Primary | ICD-10-CM

## 2020-09-02 DIAGNOSIS — E86.1 HYPOVOLEMIA: ICD-10-CM

## 2020-09-02 DIAGNOSIS — I48.20 CHRONIC ATRIAL FIBRILLATION (H): ICD-10-CM

## 2020-09-02 DIAGNOSIS — I50.32 CHRONIC DIASTOLIC HEART FAILURE (H): ICD-10-CM

## 2020-09-02 LAB — INTERPRETATION ECG - MUSE: NORMAL

## 2020-09-02 PROCEDURE — 99214 OFFICE O/P EST MOD 30 MIN: CPT | Performed by: INTERNAL MEDICINE

## 2020-09-02 RX ORDER — DILTIAZEM HYDROCHLORIDE 120 MG/1
120 CAPSULE, COATED, EXTENDED RELEASE ORAL DAILY
Qty: 90 CAPSULE | Refills: 3 | Status: SHIPPED | OUTPATIENT
Start: 2020-09-02 | End: 2021-01-01

## 2020-09-02 RX ORDER — HEPARIN SODIUM (PORCINE) LOCK FLUSH IV SOLN 100 UNIT/ML 100 UNIT/ML
5 SOLUTION INTRAVENOUS
Status: CANCELLED | OUTPATIENT
Start: 2020-09-02

## 2020-09-02 RX ORDER — POTASSIUM CHLORIDE 750 MG/1
10 TABLET, EXTENDED RELEASE ORAL 2 TIMES DAILY
Qty: 180 TABLET | Refills: 3 | Status: SHIPPED | OUTPATIENT
Start: 2020-09-02 | End: 2021-01-01

## 2020-09-02 RX ORDER — HEPARIN SODIUM,PORCINE 10 UNIT/ML
5 VIAL (ML) INTRAVENOUS
Status: CANCELLED | OUTPATIENT
Start: 2020-09-02

## 2020-09-02 RX ORDER — LISINOPRIL 5 MG/1
5 TABLET ORAL DAILY
Qty: 90 TABLET | Refills: 3 | Status: CANCELLED | OUTPATIENT
Start: 2020-09-02

## 2020-09-02 RX ORDER — TORSEMIDE 10 MG/1
10 TABLET ORAL DAILY
Qty: 90 TABLET | Refills: 3 | Status: SHIPPED | OUTPATIENT
Start: 2020-09-02 | End: 2021-01-01

## 2020-09-02 RX ORDER — ANASTROZOLE 1 MG/1
1 TABLET ORAL DAILY
Qty: 90 TABLET | Refills: 3 | Status: SHIPPED | OUTPATIENT
Start: 2020-09-02 | End: 2021-01-01

## 2020-09-02 ASSESSMENT — ENCOUNTER SYMPTOMS
HEMATURIA: 1
EYE PAIN: 0
BRUISES/BLEEDS EASILY: 0
NAUSEA: 0
ACTIVITY CHANGE: 1
CONFUSION: 1
DYSURIA: 0
APPETITE CHANGE: 1
FATIGUE: 1
ABDOMINAL PAIN: 0
WHEEZING: 0
PALPITATIONS: 0
LIGHT-HEADEDNESS: 1
SHORTNESS OF BREATH: 1
VOMITING: 0
AGITATION: 0
MYALGIAS: 0
DIZZINESS: 0
ARTHRALGIAS: 0
FEVER: 0
CHILLS: 0
DIARRHEA: 0
COUGH: 0

## 2020-09-02 ASSESSMENT — PAIN SCALES - GENERAL: PAINLEVEL: NO PAIN (0)

## 2020-09-02 NOTE — PATIENT INSTRUCTIONS
Hopefully the Bactrim will help her bladder infection...     - Vitals are doing much better today.     Medications refilled / updated today.     Keep pushing oral hydration however you can.     Get IV fluid administration set up with infusion therapy... have 1 liter of saline available once weekly, as needed.     Return as needed for follow-up with Dr. Villagran.    Clinic : [phone]  Appointment line: [phone]

## 2020-09-02 NOTE — NURSING NOTE
"Patient presents to the clinic for medication management, spouse has not given patient Lisinopril all Winter long due to hypotension.    Chief Complaint   Patient presents with     Recheck Medication       Initial /70 (BP Location: Right arm, Patient Position: Sitting, Cuff Size: Adult Regular)   Pulse 80   Temp 98.7  F (37.1  C) (Tympanic)   Resp 16   Wt 60.4 kg (133 lb 4 oz)   SpO2 96%   BMI 24.37 kg/m   Estimated body mass index is 24.37 kg/m  as calculated from the following:    Height as of 9/1/20: 1.575 m (5' 2\").    Weight as of this encounter: 60.4 kg (133 lb 4 oz).  Medication Reconciliation: complete    Yashira Romero LPN    "

## 2020-09-02 NOTE — PROGRESS NOTES
"Nursing Notes:   Yashira Romero LPN  9/2/2020  2:01 PM  Signed  Patient presents to the clinic for medication management, spouse has not given patient Lisinopril all Winter long due to hypotension.    Chief Complaint   Patient presents with     Recheck Medication       Initial /70 (BP Location: Right arm, Patient Position: Sitting, Cuff Size: Adult Regular)   Pulse 80   Temp 98.7  F (37.1  C) (Tympanic)   Resp 16   Wt 60.4 kg (133 lb 4 oz)   SpO2 96%   BMI 24.37 kg/m   Estimated body mass index is 24.37 kg/m  as calculated from the following:    Height as of 9/1/20: 1.575 m (5' 2\").    Weight as of this encounter: 60.4 kg (133 lb 4 oz).  Medication Reconciliation: complete    Yashira Romero LPN    Nursing note reviewed with patient.  Accuracy and completeness verified.   Ms. Matos is a 89 year old female who:  Patient presents with:  Recheck Medication      ICD-10-CM    1. Benign essential hypertension  I10 REVIEW OF HEALTH MAINTENANCE PROTOCOL ORDERS   2. Malignant neoplasm of central portion of left breast in female, estrogen receptor positive (H)  C50.112 anastrozole (ARIMIDEX) 1 MG tablet    Z17.0    3. Chronic atrial fibrillation (H)  I48.20 diltiazem ER COATED BEADS (CARTIA XT) 120 MG 24 hr capsule     apixaban ANTICOAGULANT (ELIQUIS ANTICOAGULANT) 5 MG tablet   4. Chronic diastolic heart failure (H)  I50.32 potassium chloride ER (KLOR-CON M) 10 MEQ CR tablet     torsemide (DEMADEX) 10 MG tablet   5. Acute cystitis with hematuria  N30.01    6. Hypovolemia  E86.1      HPI  Patient is brought in with  for follow-up of multiple issues.    Hypertension, blood pressures have been low intermittently.  She has a hard time taking in liquids.  No longer taking lisinopril.  This was removed from med list today.  Now just down to half of a tablet of torsemide daily.   was advised that if needed is okay to hold this as well.      Started having confusion and low blood pressures at home the " last couple of days.  Ended up getting sick. Seen in ER last night, started on Bactrim.     There was talk about possibly admitting her to the hospital but the hospital was on divert.  She is actually doing quite a bit better today.   states that after the IV fluids and antibiotics last night he perked up.  She is having more alertness today.  Her skin turgor and color are much improved per his report.    IV fluids once weekly x1 L were ordered through infusion therapy.  These will be available if needed.  She does have problems maintaining oral hydration at times.    Chronic atrial fibrillation, continues on diltiazem and apixaban for heart rate control and anticoagulation.  Has not had significant issues of bleeding at this time.    Chronic diastolic heart failure, continue on torsemide but only half tablet daily.  Potassium is borderline low, continue current dosing.    Acute cystitis with hematuria, noted last night.  Urinalysis quite abnormal.  Currently on Bactrim twice daily.  Already has started to have improvement.  Continue current dosing pending culture results.    Hypovolemia, improved with IV fluids last night.  Standing IV orders placed with infusion therapy once weekly as needed.  1 L of saline.    Functional Capacity: less than 4 METS. Uses transport wheelchair at home.  tries to have her walk as much as possible.   Review of Systems   Unable to perform ROS: Dementia (History is from )   Constitutional: Positive for activity change, appetite change and fatigue (+ sleeping 20+ hours daily). Negative for chills and fever.   HENT: Negative for congestion and hearing loss.    Eyes: Negative for pain and visual disturbance.   Respiratory: Positive for shortness of breath (  + improved with Torsemide). Negative for cough (intermittent, some phlegm. ) and wheezing.    Cardiovascular: Negative for chest pain and palpitations.   Gastrointestinal: Negative for abdominal pain, diarrhea,  "nausea and vomiting.   Endocrine: Negative for cold intolerance and heat intolerance.   Genitourinary: Positive for hematuria (seen in ER yesterday for UTI). Negative for dysuria.        + Hx of left breast lump, Following with oncology.   Musculoskeletal: Negative for arthralgias and myalgias.   Skin: Negative for pallor.   Allergic/Immunologic: Negative for immunocompromised state.   Neurological: Positive for light-headedness. Negative for dizziness.   Hematological: Does not bruise/bleed easily.   Psychiatric/Behavioral: Positive for confusion. Negative for agitation.        + functionally declining. Voice is weaker. Decreased appetite. More weak. Not changing / getting dressed much in past week. Sleeping 20+ hrs daily.     + much weaker for past 2 days due to UTI.         Reviewed and updated as needed this visit by Provider   Tobacco  Allergies  Meds  Problems  Med Hx  Surg Hx  Fam Hx         EXAM:   Vitals:    09/02/20 1351   BP: 122/70   BP Location: Right arm   Patient Position: Sitting   Cuff Size: Adult Regular   Pulse: 80   Resp: 16   Temp: 98.7  F (37.1  C)   TempSrc: Tympanic   SpO2: 96%   Weight: 60.4 kg (133 lb 4 oz)       Current Pain Score: No Pain (0)     BP Readings from Last 3 Encounters:   09/02/20 122/70   09/01/20 (!) 147/70   10/24/19 110/60      Wt Readings from Last 3 Encounters:   09/02/20 60.4 kg (133 lb 4 oz)   09/01/20 58.1 kg (128 lb)   09/26/19 62.6 kg (138 lb)      Estimated body mass index is 24.37 kg/m  as calculated from the following:    Height as of 9/1/20: 1.575 m (5' 2\").    Weight as of this encounter: 60.4 kg (133 lb 4 oz).     Physical Exam  Constitutional:       General: She is not in acute distress.     Appearance: She is well-developed. She is not diaphoretic.   HENT:      Head: Normocephalic and atraumatic.   Eyes:      General: No scleral icterus.     Conjunctiva/sclera: Conjunctivae normal.   Neck:      Musculoskeletal: Neck supple.   Cardiovascular:      Rate " and Rhythm: Normal rate. Rhythm irregularly irregular.      Heart sounds: Murmur present.   Pulmonary:      Effort: Pulmonary effort is normal.      Breath sounds: Normal breath sounds. No wheezing or rhonchi.   Abdominal:      Palpations: Abdomen is soft.      Tenderness: There is no abdominal tenderness.   Musculoskeletal:         General: No deformity.      Right lower leg: No edema.      Left lower leg: No edema.      Comments: + wheelchair today   Lymphadenopathy:      Cervical: No cervical adenopathy.   Skin:     General: Skin is warm and dry.      Findings: No rash.   Neurological:      Mental Status: She is alert. Mental status is at baseline.      Comments: + confusion   Psychiatric:         Mood and Affect: Mood normal.         Behavior: Behavior normal.          Procedures   INVESTIGATIONS:  - Labs reviewed in Epic     ASSESSMENT AND PLAN:  Problem List Items Addressed This Visit        Circulatory    Chronic atrial fibrillation (H)    Relevant Medications    diltiazem ER COATED BEADS (CARTIA XT) 120 MG 24 hr capsule    apixaban ANTICOAGULANT (ELIQUIS ANTICOAGULANT) 5 MG tablet    Acute on chronic diastolic heart failure (H)    Relevant Medications    potassium chloride ER (KLOR-CON M) 10 MEQ CR tablet    torsemide (DEMADEX) 10 MG tablet    Benign essential hypertension - Primary    Relevant Orders    REVIEW OF HEALTH MAINTENANCE PROTOCOL ORDERS (Completed)       Other    Hypovolemia      Other Visit Diagnoses     Malignant neoplasm of central portion of left breast in female, estrogen receptor positive (H)        Relevant Medications    anastrozole (ARIMIDEX) 1 MG tablet    Acute cystitis with hematuria            -- Expected clinical course discussed    -- Medications and their side effects discussed    Patient Instructions   Hopefully the Bactrim will help her bladder infection...     - Vitals are doing much better today.     Medications refilled / updated today.     Keep pushing oral hydration however  you can.     Get IV fluid administration set up with infusion therapy... have 1 liter of saline available once weekly, as needed.     Return as needed for follow-up with Dr. Villagran.    Clinic : [phone]  Appointment line: [phone]     Adam Villagran MD   Internal Medicine  [facility]     Portions of this note were dictated using speech recognition software. The note has been proofread but errors in the text may have been overlooked. Please contact me if there are any concerns regarding the accuracy of the dictation.

## 2020-11-03 ENCOUNTER — ALLIED HEALTH/NURSE VISIT (OUTPATIENT)
Dept: FAMILY MEDICINE | Facility: OTHER | Age: 85
End: 2020-11-03
Attending: INTERNAL MEDICINE
Payer: COMMERCIAL

## 2020-11-03 DIAGNOSIS — Z23 NEED FOR PROPHYLACTIC VACCINATION AND INOCULATION AGAINST INFLUENZA: Primary | ICD-10-CM

## 2020-11-03 PROCEDURE — 90471 IMMUNIZATION ADMIN: CPT

## 2020-11-03 PROCEDURE — 90662 IIV NO PRSV INCREASED AG IM: CPT

## 2021-01-01 ENCOUNTER — HOSPITAL ENCOUNTER (EMERGENCY)
Facility: OTHER | Age: 86
Discharge: HOME | End: 2021-04-16
Attending: PHYSICIAN ASSISTANT | Admitting: PHYSICIAN ASSISTANT
Payer: COMMERCIAL

## 2021-01-01 ENCOUNTER — HOSPITAL ENCOUNTER (OUTPATIENT)
Dept: INFUSION THERAPY | Facility: OTHER | Age: 86
Discharge: HOME | End: 2021-04-07
Attending: INTERNAL MEDICINE | Admitting: INTERNAL MEDICINE
Payer: COMMERCIAL

## 2021-01-01 ENCOUNTER — OFFICE VISIT (OUTPATIENT)
Dept: INTERNAL MEDICINE | Facility: OTHER | Age: 86
End: 2021-01-01
Attending: INTERNAL MEDICINE
Payer: COMMERCIAL

## 2021-01-01 ENCOUNTER — IMMUNIZATION (OUTPATIENT)
Dept: FAMILY MEDICINE | Facility: OTHER | Age: 86
End: 2021-01-01
Attending: INTERNAL MEDICINE
Payer: COMMERCIAL

## 2021-01-01 ENCOUNTER — IMMUNIZATION (OUTPATIENT)
Dept: FAMILY MEDICINE | Facility: OTHER | Age: 86
End: 2021-01-01
Attending: FAMILY MEDICINE
Payer: COMMERCIAL

## 2021-01-01 ENCOUNTER — TELEPHONE (OUTPATIENT)
Dept: INTERNAL MEDICINE | Facility: OTHER | Age: 86
End: 2021-01-01

## 2021-01-01 ENCOUNTER — HOSPITAL ENCOUNTER (EMERGENCY)
Facility: OTHER | Age: 86
Discharge: HOME | End: 2021-07-16
Attending: PHYSICIAN ASSISTANT | Admitting: PHYSICIAN ASSISTANT
Payer: COMMERCIAL

## 2021-01-01 ENCOUNTER — HOSPITAL ENCOUNTER (OUTPATIENT)
Dept: INFUSION THERAPY | Facility: OTHER | Age: 86
End: 2021-04-16
Attending: INTERNAL MEDICINE
Payer: COMMERCIAL

## 2021-01-01 ENCOUNTER — NURSE TRIAGE (OUTPATIENT)
Dept: NURSING | Facility: CLINIC | Age: 86
End: 2021-01-01

## 2021-01-01 ENCOUNTER — MEDICAL CORRESPONDENCE (OUTPATIENT)
Dept: HEALTH INFORMATION MANAGEMENT | Facility: OTHER | Age: 86
End: 2021-01-01
Payer: COMMERCIAL

## 2021-01-01 ENCOUNTER — APPOINTMENT (OUTPATIENT)
Dept: GENERAL RADIOLOGY | Facility: OTHER | Age: 86
End: 2021-01-01
Attending: PHYSICIAN ASSISTANT
Payer: COMMERCIAL

## 2021-01-01 ENCOUNTER — TELEPHONE (OUTPATIENT)
Dept: INTERNAL MEDICINE | Facility: OTHER | Age: 86
End: 2021-01-01
Payer: COMMERCIAL

## 2021-01-01 ENCOUNTER — APPOINTMENT (OUTPATIENT)
Dept: CT IMAGING | Facility: OTHER | Age: 86
End: 2021-01-01
Attending: PHYSICIAN ASSISTANT
Payer: COMMERCIAL

## 2021-01-01 ENCOUNTER — OFFICE VISIT (OUTPATIENT)
Dept: PEDIATRICS | Facility: OTHER | Age: 86
End: 2021-01-01
Attending: INTERNAL MEDICINE
Payer: COMMERCIAL

## 2021-01-01 ENCOUNTER — PATIENT OUTREACH (OUTPATIENT)
Dept: CARE COORDINATION | Facility: CLINIC | Age: 86
End: 2021-01-01
Payer: COMMERCIAL

## 2021-01-01 VITALS
WEIGHT: 121.6 LBS | RESPIRATION RATE: 16 BRPM | DIASTOLIC BLOOD PRESSURE: 60 MMHG | OXYGEN SATURATION: 96 % | SYSTOLIC BLOOD PRESSURE: 92 MMHG | HEART RATE: 72 BPM | BODY MASS INDEX: 22.24 KG/M2 | TEMPERATURE: 98.3 F

## 2021-01-01 VITALS
DIASTOLIC BLOOD PRESSURE: 67 MMHG | OXYGEN SATURATION: 93 % | SYSTOLIC BLOOD PRESSURE: 121 MMHG | TEMPERATURE: 97.4 F | HEART RATE: 74 BPM | WEIGHT: 123 LBS | BODY MASS INDEX: 22.63 KG/M2 | RESPIRATION RATE: 28 BRPM | HEIGHT: 62 IN

## 2021-01-01 VITALS
WEIGHT: 121 LBS | HEART RATE: 65 BPM | RESPIRATION RATE: 15 BRPM | TEMPERATURE: 98 F | BODY MASS INDEX: 22.13 KG/M2 | SYSTOLIC BLOOD PRESSURE: 130 MMHG | OXYGEN SATURATION: 98 % | DIASTOLIC BLOOD PRESSURE: 84 MMHG

## 2021-01-01 VITALS
TEMPERATURE: 97.7 F | BODY MASS INDEX: 22.57 KG/M2 | SYSTOLIC BLOOD PRESSURE: 155 MMHG | HEART RATE: 80 BPM | RESPIRATION RATE: 18 BRPM | DIASTOLIC BLOOD PRESSURE: 62 MMHG | WEIGHT: 123.4 LBS

## 2021-01-01 VITALS
WEIGHT: 121 LBS | BODY MASS INDEX: 22.13 KG/M2 | DIASTOLIC BLOOD PRESSURE: 96 MMHG | OXYGEN SATURATION: 92 % | HEART RATE: 88 BPM | SYSTOLIC BLOOD PRESSURE: 149 MMHG | RESPIRATION RATE: 18 BRPM | TEMPERATURE: 99 F

## 2021-01-01 VITALS
SYSTOLIC BLOOD PRESSURE: 135 MMHG | HEART RATE: 78 BPM | TEMPERATURE: 97.8 F | RESPIRATION RATE: 16 BRPM | DIASTOLIC BLOOD PRESSURE: 52 MMHG

## 2021-01-01 DIAGNOSIS — F03.C0 SEVERE DEMENTIA (H): ICD-10-CM

## 2021-01-01 DIAGNOSIS — J98.4 PNEUMONITIS: ICD-10-CM

## 2021-01-01 DIAGNOSIS — Z86.73 HISTORY OF RECURRENT TIAS: ICD-10-CM

## 2021-01-01 DIAGNOSIS — E86.1 HYPOVOLEMIA: Primary | ICD-10-CM

## 2021-01-01 DIAGNOSIS — R01.1 HEART MURMUR: ICD-10-CM

## 2021-01-01 DIAGNOSIS — Z79.01 CHRONIC ANTICOAGULATION: ICD-10-CM

## 2021-01-01 DIAGNOSIS — R42 ORTHOSTATIC LIGHTHEADEDNESS: ICD-10-CM

## 2021-01-01 DIAGNOSIS — Z74.09 POOR MOBILITY: ICD-10-CM

## 2021-01-01 DIAGNOSIS — R41.0 CONFUSION ASSOCIATED WITH INFECTION: ICD-10-CM

## 2021-01-01 DIAGNOSIS — E87.6 HYPOKALEMIA: Primary | ICD-10-CM

## 2021-01-01 DIAGNOSIS — R41.0 DELIRIUM: Primary | ICD-10-CM

## 2021-01-01 DIAGNOSIS — I27.20 PULMONARY HYPERTENSION, MODERATE TO SEVERE (H): ICD-10-CM

## 2021-01-01 DIAGNOSIS — I10 BENIGN ESSENTIAL HYPERTENSION: ICD-10-CM

## 2021-01-01 DIAGNOSIS — I48.20 CHRONIC ATRIAL FIBRILLATION (H): ICD-10-CM

## 2021-01-01 DIAGNOSIS — R62.7 FAILURE TO THRIVE IN ADULT: ICD-10-CM

## 2021-01-01 DIAGNOSIS — I50.32 CHRONIC DIASTOLIC HEART FAILURE (H): ICD-10-CM

## 2021-01-01 DIAGNOSIS — R53.1 WEAKNESS: ICD-10-CM

## 2021-01-01 DIAGNOSIS — M80.88XS: ICD-10-CM

## 2021-01-01 DIAGNOSIS — E86.0 DEHYDRATION: ICD-10-CM

## 2021-01-01 DIAGNOSIS — F01.50 VASCULAR DEMENTIA WITHOUT BEHAVIORAL DISTURBANCE (H): ICD-10-CM

## 2021-01-01 DIAGNOSIS — B99.9 CONFUSION ASSOCIATED WITH INFECTION: ICD-10-CM

## 2021-01-01 DIAGNOSIS — Z79.83 PERSONAL HISTORY OF ONGOING TREATMENT WITH ALENDRONATE (FOSAMAX): ICD-10-CM

## 2021-01-01 DIAGNOSIS — Z17.0 MALIGNANT NEOPLASM OF CENTRAL PORTION OF LEFT BREAST IN FEMALE, ESTROGEN RECEPTOR POSITIVE (H): ICD-10-CM

## 2021-01-01 DIAGNOSIS — R53.81 DECLINING FUNCTIONAL STATUS: ICD-10-CM

## 2021-01-01 DIAGNOSIS — Z71.85 VACCINE COUNSELING: ICD-10-CM

## 2021-01-01 DIAGNOSIS — C50.112 MALIGNANT NEOPLASM OF CENTRAL PORTION OF LEFT BREAST IN FEMALE, ESTROGEN RECEPTOR POSITIVE (H): ICD-10-CM

## 2021-01-01 DIAGNOSIS — I35.8 AORTIC VALVE SCLEROSIS: ICD-10-CM

## 2021-01-01 DIAGNOSIS — F01.50 VASCULAR DEMENTIA WITHOUT BEHAVIORAL DISTURBANCE (H): Primary | ICD-10-CM

## 2021-01-01 DIAGNOSIS — R03.1 LOW BLOOD PRESSURE READING: Primary | ICD-10-CM

## 2021-01-01 DIAGNOSIS — Z00.00 ENCOUNTER FOR MEDICARE ANNUAL WELLNESS EXAM: ICD-10-CM

## 2021-01-01 LAB
ALBUMIN SERPL-MCNC: 3.5 G/DL (ref 3.5–5.7)
ALBUMIN UR-MCNC: 20 MG/DL
ALBUMIN UR-MCNC: NEGATIVE MG/DL
ALP SERPL-CCNC: 72 U/L (ref 34–104)
ALT SERPL W P-5'-P-CCNC: 8 U/L (ref 7–52)
ANION GAP SERPL CALCULATED.3IONS-SCNC: 9 MMOL/L (ref 3–14)
ANION GAP SERPL CALCULATED.3IONS-SCNC: 9 MMOL/L (ref 3–14)
APPEARANCE UR: CLEAR
APPEARANCE UR: CLEAR
AST SERPL W P-5'-P-CCNC: 14 U/L (ref 13–39)
ATRIAL RATE - MUSE: 82 BPM
BACTERIA BLD CULT: NO GROWTH
BACTERIA BLD CULT: NO GROWTH
BACTERIA UR CULT: NO GROWTH
BASOPHILS # BLD AUTO: 0.1 10E3/UL (ref 0–0.2)
BASOPHILS # BLD AUTO: 0.1 10E9/L (ref 0–0.2)
BASOPHILS NFR BLD AUTO: 0.6 %
BASOPHILS NFR BLD AUTO: 1 %
BILIRUB SERPL-MCNC: 0.9 MG/DL (ref 0.3–1)
BILIRUB UR QL STRIP: NEGATIVE
BILIRUB UR QL STRIP: NEGATIVE
BUN SERPL-MCNC: 12 MG/DL (ref 7–25)
BUN SERPL-MCNC: 14 MG/DL (ref 7–25)
CALCIUM SERPL-MCNC: 9.7 MG/DL (ref 8.6–10.3)
CALCIUM SERPL-MCNC: 9.8 MG/DL (ref 8.6–10.3)
CHLORIDE BLD-SCNC: 107 MMOL/L (ref 98–107)
CHLORIDE SERPL-SCNC: 102 MMOL/L (ref 98–107)
CO2 SERPL-SCNC: 25 MMOL/L (ref 21–31)
CO2 SERPL-SCNC: 30 MMOL/L (ref 21–31)
COLOR UR AUTO: NORMAL
COLOR UR AUTO: YELLOW
CREAT SERPL-MCNC: 0.79 MG/DL (ref 0.6–1.2)
CREAT SERPL-MCNC: 0.8 MG/DL (ref 0.6–1.2)
CRP SERPL-MCNC: 9 MG/L
DIASTOLIC BLOOD PRESSURE - MUSE: NORMAL MMHG
DIFFERENTIAL METHOD BLD: NORMAL
EOSINOPHIL # BLD AUTO: 0.1 10E3/UL (ref 0–0.7)
EOSINOPHIL # BLD AUTO: 0.6 10E9/L (ref 0–0.7)
EOSINOPHIL NFR BLD AUTO: 1 %
EOSINOPHIL NFR BLD AUTO: 5.4 %
ERYTHROCYTE [DISTWIDTH] IN BLOOD BY AUTOMATED COUNT: 12.2 % (ref 10–15)
ERYTHROCYTE [DISTWIDTH] IN BLOOD BY AUTOMATED COUNT: 13.2 % (ref 10–15)
GFR SERPL CREATININE-BSD FRML MDRD: 65 ML/MIN/1.73M2
GFR SERPL CREATININE-BSD FRML MDRD: 68 ML/MIN/{1.73_M2}
GLUCOSE BLD-MCNC: 147 MG/DL (ref 70–105)
GLUCOSE SERPL-MCNC: 119 MG/DL (ref 70–105)
GLUCOSE UR STRIP-MCNC: NEGATIVE MG/DL
GLUCOSE UR STRIP-MCNC: NEGATIVE MG/DL
GRANULAR CAST: 3 /LPF
HCT VFR BLD AUTO: 40 % (ref 35–47)
HCT VFR BLD AUTO: 42.4 % (ref 35–47)
HGB BLD-MCNC: 13.1 G/DL (ref 11.7–15.7)
HGB BLD-MCNC: 13.9 G/DL (ref 11.7–15.7)
HGB UR QL STRIP: ABNORMAL
HGB UR QL STRIP: NEGATIVE
HYALINE CASTS: 3 /LPF
IMM GRANULOCYTES # BLD: 0.1 10E3/UL
IMM GRANULOCYTES # BLD: 0.1 10E9/L (ref 0–0.4)
IMM GRANULOCYTES NFR BLD: 0 %
IMM GRANULOCYTES NFR BLD: 0.6 %
INTERPRETATION ECG - MUSE: NORMAL
INTERPRETATION ECG - MUSE: NORMAL
KETONES UR STRIP-MCNC: 10 MG/DL
KETONES UR STRIP-MCNC: NEGATIVE MG/DL
LACTATE SERPL-SCNC: 2 MMOL/L (ref 0.7–2)
LEUKOCYTE ESTERASE UR QL STRIP: NEGATIVE
LEUKOCYTE ESTERASE UR QL STRIP: NEGATIVE
LYMPHOCYTES # BLD AUTO: 0.9 10E9/L (ref 0.8–5.3)
LYMPHOCYTES # BLD AUTO: 1.1 10E3/UL (ref 0.8–5.3)
LYMPHOCYTES NFR BLD AUTO: 8 %
LYMPHOCYTES NFR BLD AUTO: 8.9 %
MAGNESIUM SERPL-MCNC: 1.8 MG/DL (ref 1.9–2.7)
MAGNESIUM SERPL-MCNC: 1.8 MG/DL (ref 1.9–2.7)
MCH RBC QN AUTO: 30.8 PG (ref 26.5–33)
MCH RBC QN AUTO: 30.9 PG (ref 26.5–33)
MCHC RBC AUTO-ENTMCNC: 32.8 G/DL (ref 31.5–36.5)
MCHC RBC AUTO-ENTMCNC: 32.8 G/DL (ref 31.5–36.5)
MCV RBC AUTO: 94 FL (ref 78–100)
MCV RBC AUTO: 94 FL (ref 78–100)
MONOCYTES # BLD AUTO: 0.8 10E9/L (ref 0–1.3)
MONOCYTES # BLD AUTO: 0.9 10E3/UL (ref 0–1.3)
MONOCYTES NFR BLD AUTO: 6 %
MONOCYTES NFR BLD AUTO: 7.7 %
MUCOUS THREADS #/AREA URNS LPF: PRESENT /LPF
NEUTROPHILS # BLD AUTO: 11.6 10E3/UL (ref 1.6–8.3)
NEUTROPHILS # BLD AUTO: 7.8 10E9/L (ref 1.6–8.3)
NEUTROPHILS NFR BLD AUTO: 76.8 %
NEUTROPHILS NFR BLD AUTO: 84 %
NITRATE UR QL: NEGATIVE
NITRATE UR QL: NEGATIVE
NRBC # BLD AUTO: 0 10E3/UL
NRBC BLD AUTO-RTO: 0 /100
NT-PROBNP SERPL-MCNC: 228 PG/ML (ref 0–100)
P AXIS - MUSE: 68 DEGREES
PH UR STRIP: 6 [PH] (ref 5–9)
PH UR STRIP: 7 PH (ref 5–7)
PLATELET # BLD AUTO: 248 10E3/UL (ref 150–450)
PLATELET # BLD AUTO: 349 10E9/L (ref 150–450)
POTASSIUM BLD-SCNC: 3.8 MMOL/L (ref 3.5–5.1)
POTASSIUM SERPL-SCNC: 3.5 MMOL/L (ref 3.5–5.1)
PR INTERVAL - MUSE: 226 MS
PROCALCITONIN SERPL-MCNC: 2.37 NG/ML
PROT SERPL-MCNC: 7.1 G/DL (ref 6.4–8.9)
QRS DURATION - MUSE: 84 MS
QT - MUSE: 390 MS
QTC - MUSE: 455 MS
R AXIS - MUSE: -7 DEGREES
RBC # BLD AUTO: 4.24 10E12/L (ref 3.8–5.2)
RBC # BLD AUTO: 4.52 10E6/UL (ref 3.8–5.2)
RBC URINE: 13 /HPF
SARS-COV-2 RNA RESP QL NAA+PROBE: NEGATIVE
SODIUM SERPL-SCNC: 141 MMOL/L (ref 134–144)
SODIUM SERPL-SCNC: 141 MMOL/L (ref 134–144)
SOURCE: NORMAL
SP GR UR STRIP: 1.01 (ref 1–1.03)
SP GR UR STRIP: 1.02 (ref 1–1.03)
SYSTOLIC BLOOD PRESSURE - MUSE: NORMAL MMHG
T AXIS - MUSE: 17 DEGREES
TROPONIN I SERPL-MCNC: 19.5 PG/ML (ref 0–34)
TROPONIN I SERPL-MCNC: 20.8 PG/ML
UROBILINOGEN UR STRIP-MCNC: 2 MG/DL
UROBILINOGEN UR STRIP-MCNC: NORMAL MG/DL (ref 0–2)
VENTRICULAR RATE- MUSE: 82 BPM
WBC # BLD AUTO: 10.1 10E9/L (ref 4–11)
WBC # BLD AUTO: 13.8 10E3/UL (ref 4–11)
WBC URINE: 4 /HPF

## 2021-01-01 PROCEDURE — 90662 IIV NO PRSV INCREASED AG IM: CPT

## 2021-01-01 PROCEDURE — 258N000003 HC RX IP 258 OP 636: Performed by: INTERNAL MEDICINE

## 2021-01-01 PROCEDURE — 0002A PR COVID VAC PFIZER DIL RECON 30 MCG/0.3 ML IM: CPT

## 2021-01-01 PROCEDURE — 36415 COLL VENOUS BLD VENIPUNCTURE: CPT | Performed by: PHYSICIAN ASSISTANT

## 2021-01-01 PROCEDURE — 99214 OFFICE O/P EST MOD 30 MIN: CPT | Performed by: INTERNAL MEDICINE

## 2021-01-01 PROCEDURE — 71045 X-RAY EXAM CHEST 1 VIEW: CPT

## 2021-01-01 PROCEDURE — 0001A PR COVID VAC PFIZER DIL RECON 30 MCG/0.3 ML IM: CPT

## 2021-01-01 PROCEDURE — 91300 PR COVID VAC PFIZER DIL RECON 30 MCG/0.3 ML IM: CPT

## 2021-01-01 PROCEDURE — 250N000013 HC RX MED GY IP 250 OP 250 PS 637: Performed by: PHYSICIAN ASSISTANT

## 2021-01-01 PROCEDURE — 83605 ASSAY OF LACTIC ACID: CPT | Performed by: PHYSICIAN ASSISTANT

## 2021-01-01 PROCEDURE — 81001 URINALYSIS AUTO W/SCOPE: CPT | Performed by: PHYSICIAN ASSISTANT

## 2021-01-01 PROCEDURE — 83735 ASSAY OF MAGNESIUM: CPT | Performed by: PHYSICIAN ASSISTANT

## 2021-01-01 PROCEDURE — 80053 COMPREHEN METABOLIC PANEL: CPT | Performed by: PHYSICIAN ASSISTANT

## 2021-01-01 PROCEDURE — 99285 EMERGENCY DEPT VISIT HI MDM: CPT | Mod: 25 | Performed by: PHYSICIAN ASSISTANT

## 2021-01-01 PROCEDURE — 99283 EMERGENCY DEPT VISIT LOW MDM: CPT | Performed by: PHYSICIAN ASSISTANT

## 2021-01-01 PROCEDURE — U0003 INFECTIOUS AGENT DETECTION BY NUCLEIC ACID (DNA OR RNA); SEVERE ACUTE RESPIRATORY SYNDROME CORONAVIRUS 2 (SARS-COV-2) (CORONAVIRUS DISEASE [COVID-19]), AMPLIFIED PROBE TECHNIQUE, MAKING USE OF HIGH THROUGHPUT TECHNOLOGIES AS DESCRIBED BY CMS-2020-01-R: HCPCS | Performed by: PHYSICIAN ASSISTANT

## 2021-01-01 PROCEDURE — 90471 IMMUNIZATION ADMIN: CPT

## 2021-01-01 PROCEDURE — C9803 HOPD COVID-19 SPEC COLLECT: HCPCS | Performed by: PHYSICIAN ASSISTANT

## 2021-01-01 PROCEDURE — G0438 PPPS, INITIAL VISIT: HCPCS | Performed by: INTERNAL MEDICINE

## 2021-01-01 PROCEDURE — 87040 BLOOD CULTURE FOR BACTERIA: CPT | Mod: 91 | Performed by: PHYSICIAN ASSISTANT

## 2021-01-01 PROCEDURE — 84484 ASSAY OF TROPONIN QUANT: CPT | Performed by: PHYSICIAN ASSISTANT

## 2021-01-01 PROCEDURE — 93005 ELECTROCARDIOGRAM TRACING: CPT | Performed by: PHYSICIAN ASSISTANT

## 2021-01-01 PROCEDURE — 258N000003 HC RX IP 258 OP 636: Performed by: PHYSICIAN ASSISTANT

## 2021-01-01 PROCEDURE — 70450 CT HEAD/BRAIN W/O DYE: CPT

## 2021-01-01 PROCEDURE — 81003 URINALYSIS AUTO W/O SCOPE: CPT | Performed by: PHYSICIAN ASSISTANT

## 2021-01-01 PROCEDURE — 80048 BASIC METABOLIC PNL TOTAL CA: CPT | Performed by: PHYSICIAN ASSISTANT

## 2021-01-01 PROCEDURE — 96360 HYDRATION IV INFUSION INIT: CPT | Performed by: PHYSICIAN ASSISTANT

## 2021-01-01 PROCEDURE — 86140 C-REACTIVE PROTEIN: CPT | Performed by: PHYSICIAN ASSISTANT

## 2021-01-01 PROCEDURE — 96361 HYDRATE IV INFUSION ADD-ON: CPT | Performed by: PHYSICIAN ASSISTANT

## 2021-01-01 PROCEDURE — 250N000011 HC RX IP 250 OP 636: Performed by: PHYSICIAN ASSISTANT

## 2021-01-01 PROCEDURE — 96365 THER/PROPH/DIAG IV INF INIT: CPT | Performed by: PHYSICIAN ASSISTANT

## 2021-01-01 PROCEDURE — 93010 ELECTROCARDIOGRAM REPORT: CPT | Performed by: INTERNAL MEDICINE

## 2021-01-01 PROCEDURE — 0004A PR COVID VAC PFIZER DIL RECON 30 MCG/0.3 ML IM: CPT

## 2021-01-01 PROCEDURE — 84145 PROCALCITONIN (PCT): CPT | Performed by: PHYSICIAN ASSISTANT

## 2021-01-01 PROCEDURE — 99284 EMERGENCY DEPT VISIT MOD MDM: CPT | Performed by: PHYSICIAN ASSISTANT

## 2021-01-01 PROCEDURE — 96360 HYDRATION IV INFUSION INIT: CPT

## 2021-01-01 PROCEDURE — 99214 OFFICE O/P EST MOD 30 MIN: CPT | Mod: 25 | Performed by: INTERNAL MEDICINE

## 2021-01-01 PROCEDURE — 85025 COMPLETE CBC W/AUTO DIFF WBC: CPT | Performed by: PHYSICIAN ASSISTANT

## 2021-01-01 PROCEDURE — 83880 ASSAY OF NATRIURETIC PEPTIDE: CPT | Performed by: PHYSICIAN ASSISTANT

## 2021-01-01 PROCEDURE — 87086 URINE CULTURE/COLONY COUNT: CPT | Performed by: PHYSICIAN ASSISTANT

## 2021-01-01 RX ORDER — CEFDINIR 300 MG/1
300 CAPSULE ORAL 2 TIMES DAILY
Qty: 10 CAPSULE | Refills: 0 | Status: SHIPPED | OUTPATIENT
Start: 2021-01-01 | End: 2021-01-01

## 2021-01-01 RX ORDER — POTASSIUM CHLORIDE 750 MG/1
10 TABLET, EXTENDED RELEASE ORAL 2 TIMES DAILY
Qty: 90 TABLET | Refills: 0 | Status: SHIPPED | OUTPATIENT
Start: 2021-01-01 | End: 2021-01-01

## 2021-01-01 RX ORDER — SODIUM CHLORIDE 9 MG/ML
INJECTION, SOLUTION INTRAVENOUS CONTINUOUS
Status: DISCONTINUED | OUTPATIENT
Start: 2021-01-01 | End: 2021-01-01 | Stop reason: HOSPADM

## 2021-01-01 RX ORDER — MAGNESIUM OXIDE 400 MG/1
800 TABLET ORAL ONCE
Status: COMPLETED | OUTPATIENT
Start: 2021-01-01 | End: 2021-01-01

## 2021-01-01 RX ORDER — POTASSIUM CHLORIDE 750 MG/1
10 TABLET, EXTENDED RELEASE ORAL 2 TIMES DAILY
Qty: 180 TABLET | Refills: 1 | Status: SHIPPED | OUTPATIENT
Start: 2021-01-01

## 2021-01-01 RX ORDER — HEPARIN SODIUM,PORCINE 10 UNIT/ML
5 VIAL (ML) INTRAVENOUS
Status: CANCELLED | OUTPATIENT
Start: 2021-01-01

## 2021-01-01 RX ORDER — TORSEMIDE 10 MG/1
10 TABLET ORAL DAILY
Qty: 90 TABLET | Refills: 3 | OUTPATIENT
Start: 2021-01-01

## 2021-01-01 RX ORDER — DILTIAZEM HYDROCHLORIDE 120 MG/1
120 CAPSULE, COATED, EXTENDED RELEASE ORAL DAILY
Qty: 90 CAPSULE | Refills: 3 | Status: SHIPPED | OUTPATIENT
Start: 2021-01-01

## 2021-01-01 RX ORDER — ANASTROZOLE 1 MG/1
1 TABLET ORAL DAILY
Qty: 30 TABLET | Refills: 1 | Status: SHIPPED | OUTPATIENT
Start: 2021-01-01 | End: 2021-01-01

## 2021-01-01 RX ORDER — CEFTRIAXONE SODIUM 1 G/50ML
1 INJECTION, SOLUTION INTRAVENOUS ONCE
Status: COMPLETED | OUTPATIENT
Start: 2021-01-01 | End: 2021-01-01

## 2021-01-01 RX ORDER — HEPARIN SODIUM (PORCINE) LOCK FLUSH IV SOLN 100 UNIT/ML 100 UNIT/ML
5 SOLUTION INTRAVENOUS
Status: CANCELLED | OUTPATIENT
Start: 2021-01-01

## 2021-01-01 RX ORDER — POTASSIUM CHLORIDE 750 MG/1
TABLET, EXTENDED RELEASE ORAL
Qty: 180 TABLET | Refills: 2 | OUTPATIENT
Start: 2021-01-01

## 2021-01-01 RX ADMIN — CEFTRIAXONE SODIUM 1 G: 1 INJECTION, SOLUTION INTRAVENOUS at 19:36

## 2021-01-01 RX ADMIN — SODIUM CHLORIDE 1000 ML: 9 INJECTION, SOLUTION INTRAVENOUS at 14:45

## 2021-01-01 RX ADMIN — MAGNESIUM OXIDE TAB 400 MG (241.3 MG ELEMENTAL MG) 800 MG: 400 (241.3 MG) TAB at 19:13

## 2021-01-01 RX ADMIN — SODIUM CHLORIDE: 9 INJECTION, SOLUTION INTRAVENOUS at 16:52

## 2021-01-01 RX ADMIN — SODIUM CHLORIDE 1000 ML: 9 INJECTION, SOLUTION INTRAVENOUS at 13:47

## 2021-01-01 ASSESSMENT — ENCOUNTER SYMPTOMS
AGITATION: 0
APPETITE CHANGE: 1
SORE THROAT: 0
TREMORS: 0
BRUISES/BLEEDS EASILY: 0
FACIAL SWELLING: 0
CHEST TIGHTNESS: 0
NAUSEA: 0
BACK PAIN: 0
BACK PAIN: 0
NECK PAIN: 0
SHORTNESS OF BREATH: 0
CONFUSION: 1
CONFUSION: 1
BRUISES/BLEEDS EASILY: 0
ACTIVITY CHANGE: 1
STRIDOR: 0
LIGHT-HEADEDNESS: 1
MYALGIAS: 0
AGITATION: 0
WHEEZING: 0
FEVER: 0
CHILLS: 0
ABDOMINAL PAIN: 0
VOMITING: 0
WEAKNESS: 1
FEVER: 0
WOUND: 0
DIARRHEA: 0
ADENOPATHY: 0
ABDOMINAL PAIN: 0
FATIGUE: 1
ARTHRALGIAS: 0
FEVER: 0
WHEEZING: 0
SEIZURES: 0
FATIGUE: 1
UNEXPECTED WEIGHT CHANGE: 1
FATIGUE: 1
DYSURIA: 0
DIZZINESS: 0
ABDOMINAL PAIN: 0
HEMATURIA: 0
NAUSEA: 0
FLANK PAIN: 0
ACTIVITY CHANGE: 1
HEMATURIA: 0
COUGH: 0
EYE PAIN: 0
APPETITE CHANGE: 1
CHILLS: 0
VOMITING: 0
SHORTNESS OF BREATH: 0
EYE PAIN: 0
CONFUSION: 0
PALPITATIONS: 0

## 2021-01-01 ASSESSMENT — PAIN SCALES - GENERAL
PAINLEVEL: NO PAIN (0)
PAINLEVEL: NO PAIN (0)

## 2021-01-01 ASSESSMENT — MIFFLIN-ST. JEOR: SCORE: 926.17

## 2021-02-18 NOTE — TELEPHONE ENCOUNTER
Patient needs paperwork filled out but has questions on them.  Please call    Lakeisha Yu on 2/18/2021 at 8:13 AM

## 2021-04-07 NOTE — PROGRESS NOTES
Infusion Nursing Note:  Anne Matos presents today for IV fluids.    Patient seen by provider today: No   present during visit today: Not Applicable.    Note: N/A.  Patient did not meet criteria for an asymptomatic covid-19 PCR test in infusion today.     Intravenous Access:  Peripheral IV placed.    Treatment Conditions:  Not Applicable.      Post Infusion Assessment:  Patient tolerated infusion without incident.  Blood return noted pre and post infusion.  Site patent and intact, free from redness, edema or discomfort.  No evidence of extravasations.  Access discontinued per protocol.       Discharge Plan:   Discharge instructions reviewed with: Patient and Family.  Patient and/or family verbalized understanding of discharge instructions and all questions answered.  Patient and spouse declined copy of AVS.    Patient discharged in stable condition accompanied by: .  Departure Mode: Wheelchair.    Bell Meyer RN

## 2021-04-16 NOTE — DISCHARGE INSTRUCTIONS
Get plenty of fluids and rest.  As we discussed, all of your lab work, vital signs, physical exam and imaging appear well.  Referral was placed for you to follow-up with PCP, return ED if there are worsening or concerning symptoms.

## 2021-04-16 NOTE — PROGRESS NOTES
Infusion Nursing Note:  Anne Matos presents today for REQUESTING LAB WORK / POTASSIUM / FLUIDS.    Patient seen by provider today: ARRIVED AND REQUESTING TO SEE MD   present during visit today: Not Applicable.    Note: Spouse reports that patient has been declining the past couple weeks, poor oral intake and taking around 16 oz of Gatorade a day with cues.  Increased weakness and walking short distance, uses wheelchair/transfer chair.  Has stopped oral magnesium due to loose stools and finding the potasium in toilet, currently somewhat constipated.  Reports she is sleeping approximately 20 hours a day.      Intravenous Access:  No Intravenous access/labs at this visit.  Spouse arrived requesting  IV fluids, potassium, lab work and wanting her to be evaluated by provider.  Message left for provider, triage RN called for report and report given to triage RN and she reported information to provider.  Triage RN came to communicate providers request of patient being further evaluated through RN versus infusion department.    Treatment Conditions:  VORB by Dr. Villagran for patient to be further evaluated in ER.  Triage RN escorted patient over via wheelchair and spouse drove around.      Discharge Plan:   Spouse agreed with plan.     Josefina Hayes, RN

## 2021-04-16 NOTE — TELEPHONE ENCOUNTER
Reviewed message below with Dr. Villagran and per Dr. Villagran if  is concerned of increase weakness, decline and dehydration best place would be to ER.    Presented to infusion and informed  of Dr. Villagran response and  states that he was thinking of bringing her there.   states she sleeps about 20 hours a day and is needing more assistance with needs.  States that patient was having diarrhea when she was taking potassium supplement  States she would take potassium and the whole tablet would be in stool.  States she has not taken it for a few days now and diarrhea has stopped.   is concerned with patients decline, weakness.  Patient transported to ER via wheelchair,  presented to ER with patient.    Janina Marcum RN on 4/16/2021 at 12:48 PM

## 2021-04-16 NOTE — ED PROVIDER NOTES
History     Chief Complaint   Patient presents with     Generalized Weakness     HPI  Anne Matos is a 89 year old female who presents to the ED accompanied by her  for evaluation of generalized weakness.  They reports that the patient gets fluid infusions from time to time her she appears dehydrated.  The patient's  says that over the last few days she is seems less energetic than normal and not eating as well and that usually indicates that she is dehydrated.  Patient has a history of dementia and is therefore somewhat of a poor historian however she denies any chest pain, shortness of breath, abdominal pain, dysuria, diarrhea, fevers or coughs.    Allergies:  No Known Allergies    Problem List:    Patient Active Problem List    Diagnosis Date Noted     Hypovolemia 09/02/2020     Priority: Medium     Poor mobility 08/20/2019     Priority: Medium     Large mass of left breast 08/20/2019     Priority: Medium     Dysthymic disorder 01/19/2018     Priority: Medium     Mixed hyperlipidemia 01/19/2018     Priority: Medium     Benign essential hypertension 01/19/2018     Priority: Medium     History of shingles 08/09/2017     Priority: Medium     Personal history of ongoing treatment with alendronate (Fosamax) 08/09/2017     Priority: Medium     Chronic anticoagulation 06/22/2016     Priority: Medium     History of peptic ulcer 06/22/2016     Priority: Medium     DDD (degenerative disc disease), cervical 06/11/2016     Priority: Medium     Hypomagnesemia 06/11/2016     Priority: Medium     Hyponatremia 06/11/2016     Priority: Medium     Neck pain 06/11/2016     Priority: Medium     Osteoporosis 06/11/2016     Priority: Medium     Chronic atrial fibrillation (H) 10/16/2015     Priority: Medium     Biatrial enlargement 10/03/2014     Priority: Medium     Acute on chronic diastolic heart failure (H) 10/03/2014     Priority: Medium     LVH (left ventricular hypertrophy) 10/03/2014     Priority: Medium      Pulmonary hypertension, moderate to severe (H) 10/03/2014     Priority: Medium     Collapse of lumbar vertebra due to osteoporosis (H) 10/02/2014     Priority: Medium     History of recurrent TIAs 10/02/2014     Priority: Medium     Vascular dementia without behavioral disturbance (H) 10/02/2014     Priority: Medium        Past Medical History:    Past Medical History:   Diagnosis Date     Acute stress reaction      Age-related osteoporosis without current pathological fracture      Atrial fibrillation (H)      Carcinoma in situ      Dementia without behavioral disturbance (H)      Essential (primary) hypertension      Gastric ulcer without hemorrhage or perforation        Past Surgical History:    Past Surgical History:   Procedure Laterality Date     APPENDECTOMY OPEN           COLONOSCOPY      4     EXTRACAPSULAR CATARACT EXTRATION WITH INTRAOCULAR LENS IMPLANT      , ,bilateral     HEMORRHOID SURGERY      1960     HYSTERECTOMY TOTAL ABDOMINAL           OTHER SURGICAL HISTORY      3/2012,2070,CARDIOVERSION,of fibrillation in [place]     OTHER SURGICAL HISTORY      70,2085,CRYOTHERAPY OF CERVIX,Cervical conization     OTHER SURGICAL HISTORY      70,2085,CRYOTHERAPY OF CERVIX,Cervical cauterization       Family History:    Family History   Problem Relation Age of Onset     Other - See Comments Mother         Stroke     Cancer Father         Cancer,liver     Cancer Sister         Cancer,pancreatic     Cancer Brother         Cancer,lung     Other - See Comments Other         Stroke,stroke     Other - See Comments Brother          in the war     Other - See Comments Daughter         MS     Other - See Comments Other         10 total, 1 still alive       Social History:  Marital Status:   [2]  Social History     Tobacco Use     Smoking status: Former Smoker     Packs/day: 0.50     Years: 20.00     Pack years: 10.00     Types: Cigarettes     Quit date: 1981  "    Years since quittin.3     Smokeless tobacco: Never Used   Substance Use Topics     Alcohol use: Yes     Alcohol/week: 0.0 standard drinks     Comment: 2 times a week drinks 1/2 beers     Drug use: No        Medications:    anastrozole (ARIMIDEX) 1 MG tablet  apixaban ANTICOAGULANT (ELIQUIS ANTICOAGULANT) 5 MG tablet  calcium carbonate-vitamin D (OSCAL W/D) 500-200 MG-UNIT tablet  diltiazem ER COATED BEADS (CARTIA XT) 120 MG 24 hr capsule  Multiple Vitamin (MULTI-VITAMINS) TABS  torsemide (DEMADEX) 10 MG tablet  order for DME  potassium chloride ER (K-TAB/KLOR-CON) 10 MEQ CR tablet  potassium chloride ER (KLOR-CON M) 10 MEQ CR tablet          Review of Systems   Constitutional: Positive for fatigue. Negative for chills and fever.   HENT: Negative for congestion.    Eyes: Negative for visual disturbance.   Respiratory: Negative for chest tightness and shortness of breath.    Cardiovascular: Negative for chest pain.   Gastrointestinal: Negative for abdominal pain.   Genitourinary: Negative for hematuria.   Musculoskeletal: Negative for back pain.   Skin: Negative for rash and wound.   Neurological: Negative for syncope.   Hematological: Negative for adenopathy. Does not bruise/bleed easily.   Psychiatric/Behavioral: Negative for confusion.       Physical Exam   BP: 136/63  Pulse: 72  Temp: 97.4  F (36.3  C)  Resp: 15  Height: 157.5 cm (5' 2\")  Weight: 55.8 kg (123 lb)  SpO2: 97 %      Physical Exam  Constitutional:       General: She is not in acute distress.     Appearance: She is well-developed. She is not diaphoretic.   HENT:      Head: Normocephalic and atraumatic.   Eyes:      General: No scleral icterus.     Conjunctiva/sclera: Conjunctivae normal.   Neck:      Musculoskeletal: Neck supple.   Cardiovascular:      Rate and Rhythm: Normal rate and regular rhythm.   Pulmonary:      Effort: Pulmonary effort is normal.      Breath sounds: Normal breath sounds.   Abdominal:      Palpations: Abdomen is soft.    "   Tenderness: There is no abdominal tenderness.   Musculoskeletal:         General: No deformity.   Lymphadenopathy:      Cervical: No cervical adenopathy.   Skin:     General: Skin is warm and dry.      Findings: No rash.   Neurological:      Mental Status: She is alert and oriented to person, place, and time. Mental status is at baseline.   Psychiatric:         Mood and Affect: Mood normal.         Behavior: Behavior normal.         ED Course        Procedures          EKG read at 1350. HR 73, atrial fibrillation, similar to previous EKGs.     Critical Care time:  none               Results for orders placed or performed during the hospital encounter of 04/16/21 (from the past 24 hour(s))   UA reflex to Microscopic   Result Value Ref Range    Color Urine Light Yellow     Appearance Urine Clear     Glucose Urine Negative NEG^Negative mg/dL    Bilirubin Urine Negative NEG^Negative    Ketones Urine Negative NEG^Negative mg/dL    Specific Gravity Urine 1.011 1.003 - 1.035    Blood Urine Negative NEG^Negative    pH Urine 7.0 5.0 - 7.0 pH    Protein Albumin Urine Negative NEG^Negative mg/dL    Urobilinogen mg/dL Normal 0.0 - 2.0 mg/dL    Nitrite Urine Negative NEG^Negative    Leukocyte Esterase Urine Negative NEG^Negative    Source Midstream Urine    CBC with platelets differential   Result Value Ref Range    WBC 10.1 4.0 - 11.0 10e9/L    RBC Count 4.24 3.8 - 5.2 10e12/L    Hemoglobin 13.1 11.7 - 15.7 g/dL    Hematocrit 40.0 35.0 - 47.0 %    MCV 94 78 - 100 fl    MCH 30.9 26.5 - 33.0 pg    MCHC 32.8 31.5 - 36.5 g/dL    RDW 12.2 10.0 - 15.0 %    Platelet Count 349 150 - 450 10e9/L    Diff Method Automated Method     % Neutrophils 76.8 %    % Lymphocytes 8.9 %    % Monocytes 7.7 %    % Eosinophils 5.4 %    % Basophils 0.6 %    % Immature Granulocytes 0.6 %    Absolute Neutrophil 7.8 1.6 - 8.3 10e9/L    Absolute Lymphocytes 0.9 0.8 - 5.3 10e9/L    Absolute Monocytes 0.8 0.0 - 1.3 10e9/L    Absolute Eosinophils 0.6 0.0 -  0.7 10e9/L    Absolute Basophils 0.1 0.0 - 0.2 10e9/L    Abs Immature Granulocytes 0.1 0 - 0.4 10e9/L   Basic metabolic panel   Result Value Ref Range    Sodium 141 134 - 144 mmol/L    Potassium 3.5 3.5 - 5.1 mmol/L    Chloride 102 98 - 107 mmol/L    Carbon Dioxide 30 21 - 31 mmol/L    Anion Gap 9 3 - 14 mmol/L    Glucose 119 (H) 70 - 105 mg/dL    Urea Nitrogen 12 7 - 25 mg/dL    Creatinine 0.79 0.60 - 1.20 mg/dL    GFR Estimate 68 >60 mL/min/[1.73_m2]    GFR Estimate If Black 83 >60 mL/min/[1.73_m2]    Calcium 9.8 8.6 - 10.3 mg/dL   Magnesium   Result Value Ref Range    Magnesium 1.8 (L) 1.9 - 2.7 mg/dL   Troponin GH (now)   Result Value Ref Range    Troponin 20.8 <34.0 pg/mL   XR Chest Port 1 View    Narrative    PROCEDURE:  XR CHEST PORT 1 VW    HISTORY: weakness. .    COMPARISON:  9/1/2020    FINDINGS:    The cardiomediastinal contours are enlarged, unchanged. There is  calcific aortic atherosclerosis.   Left greater than right small effusions are present. No pneumothorax  is identified.      Impression    IMPRESSION: Left greater than right small pleural effusions.  Cardiomegaly.    AYSHA THOMAS MD       Medications   0.9% sodium chloride BOLUS (0 mLs Intravenous Stopped 4/16/21 1628)       Assessments & Plan (with Medical Decision Making)   Pt nontoxic in NAD. Heart, lung, bowel sounds normal, abd soft, nontender to palpation, nondistended. VSS, afebrile.     No leukocytosis, normal hgb, BMP unremarkable. Troponin was normal.  Magnesium 1.8.  EKG shows chronic A. Fib.  Chest x-ray shows very mild pleural effusions, reviewing past x-rays these have been present for quite some time.    Patient was given small amount of fluids.  Upon reassessment she appears to be doing very well and much improved than when arrived.    Currently, she appears safe for discharge.  Referral is placed to follow-up with PCP for reassessment.    Strict return precautions are given to the pt, they will return if symptoms are  worsening or concerning. The pt understands and agrees with the plan and they are discharged.     Jd Torres PA-C        I have reviewed the nursing notes.    I have reviewed the findings, diagnosis, plan and need for follow up with the patient.       New Prescriptions    No medications on file       Final diagnoses:   Weakness       4/16/2021   [facility]     Jd Torres PA  04/16/21 [id number]

## 2021-04-16 NOTE — TELEPHONE ENCOUNTER
Anne will be here for fluids today at noon for about 2 hours.   states she has been failing - fairly unresponsive a lot of the time.  Would PCP be able to see today?

## 2021-04-16 NOTE — ED TRIAGE NOTES
"ED Nursing Triage Note (General)   ________________________________    Anne Matos is a 89 year old Female that presents to triage private car  With history of  Dehydration in the past with an old order at infusion dept for fluids but the MD wanted her seen since it is a long time since the order was placed reported by spouse/SO  Significant symptoms had onset 2 week(s) ago.  /63   Pulse 72   Temp 97.4  F (36.3  C) (Tympanic)   Resp 15   Ht 1.575 m (5' 2\")   Wt 55.8 kg (123 lb)   SpO2 97%   Breastfeeding No   BMI 22.50 kg/m  t  Patient appears alert , in mild distress., and cooperative behavior.    GCS Total = 15  Airway: intact  Breathing noted as Normal.  Circulation Normal  Skin normal  Action taken:  To room 905      PRE HOSPITAL PRIOR LIVING SITUATION Spouse  "

## 2021-04-27 NOTE — TELEPHONE ENCOUNTER
DJS-patient  is wondering if he should bring anything into the appointment this afternoon    Please call and advise    Thank You    Olivia Temple on 4/27/2021 at 8:00 AM

## 2021-04-27 NOTE — TELEPHONE ENCOUNTER
Patient decided that she is going to have her  be seen at her apt. Today.  He will let me reception know when he gets here.   India Kenney LPN........................4/27/2021  12:12 PM

## 2021-06-04 PROBLEM — Z85.3 HISTORY OF LEFT BREAST CANCER: Status: ACTIVE | Noted: 2019-08-20

## 2021-06-04 NOTE — PROGRESS NOTES
"  SUBJECTIVE:   Anne Matos is a 89 year old female who presents for Preventive Visit.  Patient has been advised of split billing requirements and indicates understanding: Yes  Are you in the first 12 months of your Medicare Part B coverage?  No    Physical Health:    In general, how would you rate your overall physical health? poor    Outside of work, how many days during the week do you exercise? none    Outside of work, approximately how many minutes a day do you exercise?not applicable    If you drink alcohol do you typically have >3 drinks per day or >7 drinks per week? No    Do you usually eat at least 4 servings of fruit and vegetables a day, include whole grains & fiber and avoid regularly eating high fat or \"junk\" foods? Yes    Do you have any problems taking medications regularly?  YES -  helps (Dementia)    Do you have any side effects from medications? none    Needs assistance for the following daily activities: telephone use, transportation, shopping, preparing meals, housework, bathing, laundry, money management and taking medicine    Which of the following safety concerns are present in your home?  none identified     Hearing impairment: Yes, Difficulty understanding soft or whispered speech.    In the past 6 months, have you been bothered by leaking of urine? yes    Mental Health:    In general, how would you rate your overall mental or emotional health? fair  PHQ-2 Score:      Do you feel safe in your environment? NA    Have you ever done Advance Care Planning? (For example, a Health Directive, POLST, or a discussion with a medical provider or your loved ones about your wishes): Yes, patient states has an Advance Care Planning document and will bring a copy to the clinic.    Fall risk:  Fallen 2 or more times in the past year?: No  Any fall with injury in the past year?: No    Cognitive Screening: Not appropriate due to known dementia    Reviewed and updated as needed this visit by " clinical staff  Tobacco  Allergies  Meds  Problems  Med Hx  Surg Hx  Fam Hx  Soc Hx          Reviewed and updated as needed this visit by Provider  Tobacco  Allergies  Meds  Problems  Med Hx  Surg Hx  Fam Hx         Social History     Tobacco Use     Smoking status: Former Smoker     Packs/day: 0.50     Years: 20.00     Pack years: 10.00     Types: Cigarettes     Quit date: 1981     Years since quittin.4     Smokeless tobacco: Never Used   Substance Use Topics     Alcohol use: Yes     Alcohol/week: 0.0 standard drinks     Comment: 2 times a week drinks 1/2 beers                           Current providers sharing in care for this patient include:   Patient Care Team:  Adam Villagran MD as PCP - General (Internal Medicine)  Adam Villagran MD as Assigned PCP    The following health maintenance items are reviewed in Epic and correct as of today:  Health Maintenance   Topic Date Due     DEPRESSION ACTION PLAN  Never done     ZOSTER IMMUNIZATION (1 of 2) Never done     FALL RISK ASSESSMENT  2021     MEDICARE ANNUAL WELLNESS VISIT  2022     DTAP/TDAP/TD IMMUNIZATION (2 - Td) 2023     ADVANCE CARE PLANNING  2026     INFLUENZA VACCINE  Completed     Pneumococcal Vaccine: 65+ Years  Completed     COVID-19 Vaccine  Completed     IPV IMMUNIZATION  Aged Out     MENINGITIS IMMUNIZATION  Aged Out     HEPATITIS B IMMUNIZATION  Aged Out     PHQ-9  Discontinued     Review of Systems   Unable to perform ROS: Dementia (History is from )   Constitutional: Positive for activity change, appetite change, fatigue (+ sleeping 20+ hours daily) and unexpected weight change (about 20 lbs in past year (previously about 140 lbs). Negative for chills and fever.   HENT: Negative for congestion and hearing loss.    Eyes: Negative for pain and visual disturbance.   Respiratory: Negative for cough (intermittent, some phlegm. ), shortness of breath (Hx of Torsemide use - stopped 2021) and  "wheezing.    Cardiovascular: Negative for chest pain, palpitations and leg swelling.   Gastrointestinal: Negative for abdominal pain, diarrhea, nausea and vomiting.   Endocrine: Negative for cold intolerance and heat intolerance.   Genitourinary: Negative for dysuria and hematuria.        + Hx of left breast lump, Following with oncology.   Musculoskeletal: Negative for arthralgias and myalgias.   Skin: Negative for pallor.   Allergic/Immunologic: Negative for immunocompromised state.   Neurological: Positive for light-headedness. Negative for dizziness.   Hematological: Does not bruise/bleed easily.   Psychiatric/Behavioral: Positive for confusion. Negative for agitation.        + functionally declining. Voice is weaker. Decreased appetite. More weak. Not changing / getting dressed much in past week. Sleeping 20+ hrs daily.      OBJECTIVE:   BP 92/60 (BP Location: Right arm, Patient Position: Sitting, Cuff Size: Adult Regular)   Pulse 72   Temp 98.3  F (36.8  C) (Tympanic)   Resp 16   Wt 55.2 kg (121 lb 9.6 oz)   SpO2 96%   BMI 22.24 kg/m   Estimated body mass index is 22.24 kg/m  as calculated from the following:    Height as of 4/16/21: 1.575 m (5' 2\").    Weight as of this encounter: 55.2 kg (121 lb 9.6 oz).    Physical Exam  Constitutional:       General: She is not in acute distress.     Appearance: She is well-developed. She is not diaphoretic.   HENT:      Head: Normocephalic and atraumatic.   Eyes:      General: No scleral icterus.     Conjunctiva/sclera: Conjunctivae normal.   Neck:      Musculoskeletal: Neck supple.   Cardiovascular:      Rate and Rhythm: Normal rate. Rhythm irregularly irregular.      Heart sounds: Murmur present.   Pulmonary:      Effort: Pulmonary effort is normal.      Breath sounds: Normal breath sounds. No wheezing or rhonchi.   Abdominal:      Palpations: Abdomen is soft.      Tenderness: There is no abdominal tenderness.   Musculoskeletal:         General: No deformity.      " Right lower leg: No edema.      Left lower leg: No edema.      Comments: + wheelchair today   Lymphadenopathy:      Cervical: No cervical adenopathy.   Skin:     General: Skin is warm and dry.      Findings: No rash.   Neurological:      Mental Status: She is alert. Mental status is at baseline.      Comments: + confusion   Psychiatric:         Mood and Affect: Mood normal.         Behavior: Behavior normal.        Diagnostic Test Results:  Labs reviewed in Epic  Admission on 04/16/2021, Discharged on 04/16/2021   Component Date Value Ref Range Status     Interpretation ECG 04/16/2021 Click View Image link to view waveform and result   Final     Color Urine 04/16/2021 Light Yellow   Final     Appearance Urine 04/16/2021 Clear   Final     Glucose Urine 04/16/2021 Negative  NEG^Negative mg/dL Final     Bilirubin Urine 04/16/2021 Negative  NEG^Negative Final     Ketones Urine 04/16/2021 Negative  NEG^Negative mg/dL Final     Specific Gravity Urine 04/16/2021 1.011  1.003 - 1.035 Final     Blood Urine 04/16/2021 Negative  NEG^Negative Final     pH Urine 04/16/2021 7.0  5.0 - 7.0 pH Final     Protein Albumin Urine 04/16/2021 Negative  NEG^Negative mg/dL Final     Urobilinogen mg/dL 04/16/2021 Normal  0.0 - 2.0 mg/dL Final     Nitrite Urine 04/16/2021 Negative  NEG^Negative Final     Leukocyte Esterase Urine 04/16/2021 Negative  NEG^Negative Final     Source 04/16/2021 Midstream Urine   Final     WBC 04/16/2021 10.1  4.0 - 11.0 10e9/L Final     RBC Count 04/16/2021 4.24  3.8 - 5.2 10e12/L Final     Hemoglobin 04/16/2021 13.1  11.7 - 15.7 g/dL Final     Hematocrit 04/16/2021 40.0  35.0 - 47.0 % Final     MCV 04/16/2021 94  78 - 100 fl Final     MCH 04/16/2021 30.9  26.5 - 33.0 pg Final     MCHC 04/16/2021 32.8  31.5 - 36.5 g/dL Final     RDW 04/16/2021 12.2  10.0 - 15.0 % Final     Platelet Count 04/16/2021 349  150 - 450 10e9/L Final     Diff Method 04/16/2021 Automated Method   Final     % Neutrophils 04/16/2021 76.8  %  Final     % Lymphocytes 04/16/2021 8.9  % Final     % Monocytes 04/16/2021 7.7  % Final     % Eosinophils 04/16/2021 5.4  % Final     % Basophils 04/16/2021 0.6  % Final     % Immature Granulocytes 04/16/2021 0.6  % Final     Absolute Neutrophil 04/16/2021 7.8  1.6 - 8.3 10e9/L Final     Absolute Lymphocytes 04/16/2021 0.9  0.8 - 5.3 10e9/L Final     Absolute Monocytes 04/16/2021 0.8  0.0 - 1.3 10e9/L Final     Absolute Eosinophils 04/16/2021 0.6  0.0 - 0.7 10e9/L Final     Absolute Basophils 04/16/2021 0.1  0.0 - 0.2 10e9/L Final     Abs Immature Granulocytes 04/16/2021 0.1  0 - 0.4 10e9/L Final     Sodium 04/16/2021 141  134 - 144 mmol/L Final     Potassium 04/16/2021 3.5  3.5 - 5.1 mmol/L Final     Chloride 04/16/2021 102  98 - 107 mmol/L Final     Carbon Dioxide 04/16/2021 30  21 - 31 mmol/L Final     Anion Gap 04/16/2021 9  3 - 14 mmol/L Final     Glucose 04/16/2021 119* 70 - 105 mg/dL Final     Urea Nitrogen 04/16/2021 12  7 - 25 mg/dL Final     Creatinine 04/16/2021 0.79  0.60 - 1.20 mg/dL Final     GFR Estimate 04/16/2021 68  >60 mL/min/[1.73_m2] Final     GFR Estimate If Black 04/16/2021 83  >60 mL/min/[1.73_m2] Final     Calcium 04/16/2021 9.8  8.6 - 10.3 mg/dL Final     Magnesium 04/16/2021 1.8* 1.9 - 2.7 mg/dL Final     Troponin 04/16/2021 20.8  <34.0 pg/mL Final           ASSESSMENT / PLAN:       ICD-10-CM    1. Low blood pressure reading  R03.1    2. Orthostatic lightheadedness  R42    3. Chronic diastolic heart failure (H)  I50.32    4. Chronic atrial fibrillation (H)  I48.20 apixaban ANTICOAGULANT (ELIQUIS ANTICOAGULANT) 5 MG tablet     diltiazem ER COATED BEADS (CARTIA XT) 120 MG 24 hr capsule   5. Chronic anticoagulation  Z79.01    6. Vascular dementia without behavioral disturbance (H)  F01.50    7. Encounter for Medicare annual wellness exam  Z00.00    8. Benign essential hypertension  I10    9. Collapse of lumbar vertebra due to osteoporosis, sequela  M80.88XS    10. History of recurrent TIAs   Z86.73    11. Personal history of ongoing treatment with alendronate (Fosamax)  Z79.83    12. Pulmonary hypertension, moderate to severe (H)  I27.20    13. Poor mobility  Z74.09    14. Vaccine counseling  Z71.89    Patient comes in with her  for annual Medicare wellness visit as well as follow-up multiple issues.    Blood pressure is quite low.  She ended up having to go to the emergency room the other day due to dehydration.  She had acute heart failure problems number of months back that resulted in starting potassium and torsemide.  Her blood pressure is quite low today.  She reportedly has some orthostatic dizziness and lightheadedness per 's report.  She is quite concerned if she is on a toilet because she could potentially pass out when trying to get on or off the toilet.  At this point, we need to make some medication changes.    Stop Potassium and Torsemide for now.    -- If shortness of breath and fluid retention / edema return, please restart them. (take them together)   -- Sometimes people take 1 or 2 doses per week, based on body weight / edema.     Advise  to let us know if there are any changes in her fluid retention or weight and we can restart 1 tablet of potassium and 1 tablet of torsemide daily or even just a couple times per week.  If needed we can send a new refills.    Chronic diastolic heart failure, see above.  Had heart failure exacerbation earlier this year, she appears to be hypovolemic with low blood pressure today.  92/60.    Chronic atrial fibrillation.  Having said been controlled.  Currently on diltiazem and apixaban, continue current dosing.    Vascular dementia without behavioral disturbance.  Chronic.   provides all of the history.  -History of multiple TIAs.   is her primary caregiver.    Currently on Arimidex due to left breast cancer, this is a recent diagnosis.  No surgery was advised given her age and comorbidities.    Hypertension, pressures  "are low, stop torsemide and potassium.    History of vertebral compression fracture, finished up Fosamax and is no longer using.    Pulmonary hypertension, noted with previous echo-moderate to severe.  Monitor closely with reduction/discontinuation of her oral diuretic at this time.    He has poor mobility and comes in with a wheelchair today.    Vaccine counseling completed today.    Patient has been advised of split billing requirements and indicates understanding: Yes    COUNSELING:  Reviewed preventive health counseling, as reflected in patient instructions  Special attention given to:       Regular exercise       Healthy diet/nutrition    Estimated body mass index is 22.24 kg/m  as calculated from the following:    Height as of 4/16/21: 1.575 m (5' 2\").    Weight as of this encounter: 55.2 kg (121 lb 9.6 oz).    Weight management plan noted, stable and monitoring    She reports that she quit smoking about 40 years ago. Her smoking use included cigarettes. She has a 10.00 pack-year smoking history. She has never used smokeless tobacco.    Appropriate preventive services were discussed with this patient, including applicable screening as appropriate for cardiovascular disease, diabetes, osteopenia/osteoporosis, and glaucoma.  As appropriate for age/gender, discussed screening for colorectal cancer, prostate cancer, breast cancer, and cervical cancer. Checklist reviewing preventive services available has been given to the patient.    Reviewed patients plan of care and provided an AVS. The Complex Care Plan (for patients with higher acuity and needing more deliberate coordination of services) for Anne meets the Care Plan requirement. This Care Plan has been established and reviewed with the Patient, and spouse (caregiver).    Counseling Resources:  ATP IV Guidelines  Pooled Cohorts Equation Calculator  Breast Cancer Risk Calculator  BRCA-Related Cancer Risk Assessment: FHS-7 Tool  FRAX Risk Assessment  ICSI " Preventive Guidelines  Dietary Guidelines for Americans, 2010  USDA's MyPlate  ASA Prophylaxis  Lung CA Screening    Adam Villagran MD  [facility]

## 2021-06-04 NOTE — PATIENT INSTRUCTIONS
Patient Education   Personalized Prevention Plan  You are due for the preventive services outlined below.  Your care team is available to assist you in scheduling these services.  If you have already completed any of these items, please share that information with your care team to update in your medical record.  Health Maintenance Due   Topic Date Due     Depression Action Plan  Never done     Zoster (Shingles) Vaccine (1 of 2) Never done     Preventive Health Recommendations    See your health care provider every year to    Review health changes.     Discuss preventive care.      Review your medicines if your doctor has prescribed any.    You no longer need a yearly Pap test unless you've had an abnormal Pap test in the past 10 years. If you have vaginal symptoms, such as bleeding or discharge, be sure to talk with your provider about a Pap test.    Every 1 to 2 years, have a mammogram.  If you are over 69, talk with your health care provider about whether or not you want to continue having screening mammograms.    Every 10 years, have a colonoscopy. Or, have a yearly FIT test (stool test). These exams will check for colon cancer.     Have a cholesterol test every 5 years, or more often if your doctor advises it.     Have a diabetes test (fasting glucose) every three years. If you are at risk for diabetes, you should have this test more often.     At age 65, have a bone density scan (DEXA) to check for osteoporosis (brittle bone disease).    Shots:    Get a flu shot each year.    Get a tetanus shot every 10 years.    Talk to your doctor about your pneumonia vaccines. There are now two you should receive - Pneumovax (PPSV 23) and Prevnar (PCV 13).    Talk to your pharmacist about the shingles vaccine.    Talk to your doctor about the hepatitis B vaccine.    Nutrition:     Eat at least 5 servings of fruits and vegetables each day.    Eat whole-grain bread, whole-wheat pasta and brown rice instead of white grains  and rice.    Get adequate Calcium and Vitamin D.     Lifestyle    Exercise at least 150 minutes a week (30 minutes a day, 5 days a week). This will help you control your weight and prevent disease.    Limit alcohol to one drink per day.    No smoking.     Wear sunscreen to prevent skin cancer.     See your dentist twice a year for an exam and cleaning.    See your eye doctor every 1 to 2 years to screen for conditions such as glaucoma, macular degeneration and cataracts.    Personalized Prevention Plan  You are due for the preventive services outlined below.  Your care team is available to assist you in scheduling these services.  If you have already completed any of these items, please share that information with your care team to update in your medical record.  Health Maintenance   Topic Date Due     DEPRESSION ACTION PLAN  Never done     ZOSTER IMMUNIZATION (1 of 2) Never done     FALL RISK ASSESSMENT  09/02/2021     MEDICARE ANNUAL WELLNESS VISIT  06/04/2022     DTAP/TDAP/TD IMMUNIZATION (2 - Td) 08/06/2023     ADVANCE CARE PLANNING  10/16/2023     INFLUENZA VACCINE  Completed     Pneumococcal Vaccine: 65+ Years  Completed     COVID-19 Vaccine  Completed     IPV IMMUNIZATION  Aged Out     MENINGITIS IMMUNIZATION  Aged Out     HEPATITIS B IMMUNIZATION  Aged Out     PHQ-9  Discontinued           Blood pressure is low today.     Stop Potassium and Torsemide for now.    -- If shortness of breath and fluid retention / edema return, please restart them. (take them together)     -- Sometimes people take 1 or 2 doses per week, based on body weight / edema.       Medications refilled.     Return in approximately 1 year, or sooner as needed for follow-up with Dr. Villagran.  - Annual Follow-up / Physical - Medicare Annual Wellness Visit     Clinic : [phone]  Appointment line: [phone]

## 2021-07-16 NOTE — ED PROVIDER NOTES
"  History     Chief Complaint   Patient presents with     Altered Mental Status     HPI  Anne Matos is a 89 year old female who is brought in  by her  due to a 2-week history of gradual worsening of weakness fatigue and confusion.  He is concerned that she may be dehydrated because \"she does not seem to be drinking much.\"  He reports that she has had decreased urinary output as well since she has only urinated maybe once in 2 days.  Her appetite is decreased.  He gives her her tablets and she does not always swallow them.  She does have a history of dementia.  And she is currently on Eliquis due to chronic atrial fibrillation.      Allergies:  No Known Allergies    Problem List:    Patient Active Problem List    Diagnosis Date Noted     Hypovolemia 09/02/2020     Priority: Medium     Poor mobility 08/20/2019     Priority: Medium     History of left breast cancer 08/20/2019     Priority: Medium     Dysthymic disorder 01/19/2018     Priority: Medium     Mixed hyperlipidemia 01/19/2018     Priority: Medium     Benign essential hypertension 01/19/2018     Priority: Medium     History of shingles 08/09/2017     Priority: Medium     Personal history of ongoing treatment with alendronate (Fosamax) 08/09/2017     Priority: Medium     Chronic anticoagulation 06/22/2016     Priority: Medium     History of peptic ulcer 06/22/2016     Priority: Medium     DDD (degenerative disc disease), cervical 06/11/2016     Priority: Medium     Hypomagnesemia 06/11/2016     Priority: Medium     Hyponatremia 06/11/2016     Priority: Medium     Neck pain 06/11/2016     Priority: Medium     Osteoporosis 06/11/2016     Priority: Medium     Chronic atrial fibrillation (H) 10/16/2015     Priority: Medium     Biatrial enlargement 10/03/2014     Priority: Medium     Chronic diastolic heart failure (H) 10/03/2014     Priority: Medium     LVH (left ventricular hypertrophy) 10/03/2014     Priority: Medium     Pulmonary hypertension, " moderate to severe (H) 10/03/2014     Priority: Medium     Collapse of lumbar vertebra due to osteoporosis (H) 10/02/2014     Priority: Medium     History of recurrent TIAs 10/02/2014     Priority: Medium     Vascular dementia without behavioral disturbance (H) 10/02/2014     Priority: Medium        Past Medical History:    Past Medical History:   Diagnosis Date     Acute stress reaction      Age-related osteoporosis without current pathological fracture      Atrial fibrillation (H)      Carcinoma in situ      Dementia without behavioral disturbance (H)      Essential (primary) hypertension      Gastric ulcer without hemorrhage or perforation        Past Surgical History:    Past Surgical History:   Procedure Laterality Date     APPENDECTOMY OPEN           COLONOSCOPY      04     EXTRACAPSULAR CATARACT EXTRATION WITH INTRAOCULAR LENS IMPLANT      , ,bilateral     HEMORRHOID SURGERY      1960     HYSTERECTOMY TOTAL ABDOMINAL           OTHER SURGICAL HISTORY      3/2012,2070,CARDIOVERSION,of fibrillation in [place]     OTHER SURGICAL HISTORY      70,2085,CRYOTHERAPY OF CERVIX,Cervical conization     OTHER SURGICAL HISTORY      70,2085,CRYOTHERAPY OF CERVIX,Cervical cauterization       Family History:    Family History   Problem Relation Age of Onset     Other - See Comments Mother         Stroke     Cancer Father         Cancer,liver     Cancer Sister         Cancer,pancreatic     Cancer Brother         Cancer,lung     Other - See Comments Other         Stroke,stroke     Other - See Comments Brother          in the war     Other - See Comments Daughter         MS     Other - See Comments Other         10 total, 1 still alive       Social History:  Marital Status:   [2]  Social History     Tobacco Use     Smoking status: Former Smoker     Packs/day: 0.50     Years: 20.00     Pack years: 10.00     Types: Cigarettes     Quit date: 1981     Years since quitting:  40.5     Smokeless tobacco: Never Used   Substance Use Topics     Alcohol use: Yes     Alcohol/week: 0.0 standard drinks     Comment: 2 times a week drinks 1/2 beers     Drug use: No        Medications:    anastrozole (ARIMIDEX) 1 MG tablet  apixaban ANTICOAGULANT (ELIQUIS ANTICOAGULANT) 5 MG tablet  calcium carbonate-vitamin D (OSCAL W/D) 500-200 MG-UNIT tablet  diltiazem ER COATED BEADS (CARTIA XT) 120 MG 24 hr capsule  Multiple Vitamin (MULTI-VITAMINS) TABS  order for DME          Review of Systems   Constitutional: Positive for activity change, appetite change and fatigue. Negative for fever.   HENT: Negative for facial swelling and sore throat.    Eyes: Negative for pain.   Respiratory: Negative for wheezing and stridor.    Cardiovascular: Negative for chest pain.   Gastrointestinal: Negative for abdominal pain, nausea and vomiting.   Genitourinary: Negative for flank pain.   Musculoskeletal: Negative for back pain and neck pain.   Skin: Negative for pallor.   Neurological: Positive for weakness. Negative for tremors and seizures.   Psychiatric/Behavioral: Positive for confusion. Negative for agitation.   All other systems reviewed and are negative.      Physical Exam   BP: (!) 152/70  Pulse: 95  Temp: 99  F (37.2  C)  Resp: 18  Weight: 54.9 kg (121 lb)  SpO2: 95 %    Vitals:    07/16/21 1645 07/16/21 1700 07/16/21 1815 07/16/21 1830   BP: (!) 148/73  (!) 153/97 (!) 149/96   Pulse: 77 88 88 88   Resp:       Temp:       TempSrc:       SpO2: 91% 92% 92%    Weight:           Physical Exam  Vitals and nursing note reviewed.   Constitutional:       General: She is not in acute distress.     Appearance: She is not ill-appearing or toxic-appearing.   HENT:      Head: No raccoon eyes or Chao's sign.      Jaw: No trismus.      Right Ear: No drainage or tenderness.      Left Ear: No drainage or tenderness.      Nose: Nose normal.   Eyes:      General: Lids are normal. Gaze aligned appropriately. No scleral icterus.      Extraocular Movements: Extraocular movements intact.      Right eye: No nystagmus.      Left eye: No nystagmus.      Pupils:      Right eye: Pupil is reactive and not sluggish.      Left eye: Pupil is reactive and not sluggish.   Neck:      Vascular: No JVD.      Trachea: No tracheal deviation.   Cardiovascular:      Rate and Rhythm: Normal rate.   Pulmonary:      Effort: Pulmonary effort is normal. No respiratory distress.      Breath sounds: No stridor. No wheezing.      Comments: Lung sounds are clear but decreased.  Does not appear to be in any respiratory distress.  No tachypnea.  SaO2 is 95% on room air.  Abdominal:      General: Bowel sounds are normal.      Palpations: There is no mass.      Tenderness: There is no guarding.   Musculoskeletal:         General: No deformity or signs of injury. Normal range of motion.   Skin:     General: Skin is warm and dry.      Coloration: Skin is not jaundiced or pale.   Neurological:      General: No focal deficit present.      Mental Status: She is alert and oriented to person, place, and time.      GCS: GCS eye subscore is 4. GCS verbal subscore is 5. GCS motor subscore is 6.      Motor: No tremor or seizure activity.   Psychiatric:         Behavior: Behavior normal.      Comments: Patient acknowledges the provider but I am unsure if she is hearing my questions at time versus dementia.         ED Course     EKG shows sinus rhythm with sinus arrhythmia and first-degree AV block.  Cannot rule out inferior infarct, age undetermined.  Anterior septal infarct, age undetermined heart rate is 82.  Although in lead V1 this appears to be more of atrial fibrillation as I can see the P waves clearly.  This would then be unchanged from previous EKG on April 16, 2021.      Results for orders placed or performed during the hospital encounter of 07/16/21 (from the past 24 hour(s))   EKG 12-lead, tracing only   Result Value Ref Range    Systolic Blood Pressure  mmHg    Diastolic Blood  Pressure  mmHg    Ventricular Rate 82 BPM    Atrial Rate 82 BPM    NH Interval 226 ms    QRS Duration 84 ms     ms    QTc 455 ms    P Axis 68 degrees    R AXIS -7 degrees    T Axis 17 degrees    Interpretation ECG       Sinus rhythm with sinus arrhythmia with 1st degree A-V block  Cannot rule out Inferior infarct (cited on or before 01-SEP-2020)  Anteroseptal infarct (cited on or before 01-SEP-2020)  Abnormal ECG  When compared with ECG of 16-APR-2021 13:49,  Sinus rhythm has replaced Atrial fibrillation  Confirmed by MD GODWIN DARIN ([id number]) on 7/16/2021 9:30:54 PM     Lactic acid whole blood   Result Value Ref Range    Lactic Acid 2.0 0.7 - 2.0 mmol/L   CBC with platelets differential    Narrative    The following orders were created for panel order CBC with platelets differential.  Procedure                               Abnormality         Status                     ---------                               -----------         ------                     CBC with platelets and d...[[id number]]  Abnormal            Final result                 Please view results for these tests on the individual orders.   Comprehensive metabolic panel   Result Value Ref Range    Sodium 141 134 - 144 mmol/L    Potassium 3.8 3.5 - 5.1 mmol/L    Chloride 107 98 - 107 mmol/L    Carbon Dioxide (CO2) 25 21 - 31 mmol/L    Anion Gap 9 3 - 14 mmol/L    Urea Nitrogen 14 7 - 25 mg/dL    Creatinine 0.80 0.60 - 1.20 mg/dL    Calcium 9.7 8.6 - 10.3 mg/dL    Glucose 147 (H) 70 - 105 mg/dL    Alkaline Phosphatase 72 34 - 104 U/L    AST 14 13 - 39 U/L    ALT 8 7 - 52 U/L    Protein Total 7.1 6.4 - 8.9 g/dL    Albumin 3.5 3.5 - 5.7 g/dL    Bilirubin Total 0.9 0.3 - 1.0 mg/dL    GFR Estimate 65 >60 mL/min/1.73m2   Magnesium   Result Value Ref Range    Magnesium 1.8 (L) 1.9 - 2.7 mg/dL   Troponin I   Result Value Ref Range    Troponin I 19.5 0.0 - 34.0 pg/mL   CRP inflammation   Result Value Ref Range    CRP Inflammation 9.0 <10.0 mg/L   CBC  with platelets and differential   Result Value Ref Range    WBC Count 13.8 (H) 4.0 - 11.0 10e3/uL    RBC Count 4.52 3.80 - 5.20 10e6/uL    Hemoglobin 13.9 11.7 - 15.7 g/dL    Hematocrit 42.4 35.0 - 47.0 %    MCV 94 78 - 100 fL    MCH 30.8 26.5 - 33.0 pg    MCHC 32.8 31.5 - 36.5 g/dL    RDW 13.2 10.0 - 15.0 %    Platelet Count 248 150 - 450 10e3/uL    % Neutrophils 84 %    % Lymphocytes 8 %    % Monocytes 6 %    % Eosinophils 1 %    % Basophils 1 %    % Immature Granulocytes 0 %    NRBCs per 100 WBC 0 <1 /100    Absolute Neutrophils 11.6 (H) 1.6 - 8.3 10e3/uL    Absolute Lymphocytes 1.1 0.8 - 5.3 10e3/uL    Absolute Monocytes 0.9 0.0 - 1.3 10e3/uL    Absolute Eosinophils 0.1 0.0 - 0.7 10e3/uL    Absolute Basophils 0.1 0.0 - 0.2 10e3/uL    Absolute Immature Granulocytes 0.1 (H) <=0.0 10e3/uL    Absolute NRBCs 0.0 10e3/uL   Procalcitonin   Result Value Ref Range    Procalcitonin 2.37 <5.00 ng/mL   CT Head w/o Contrast    Narrative    PROCEDURE: CT HEAD W/O CONTRAST     HISTORY: Mental status change, unknown cause.    COMPARISON: None.    TECHNIQUE:  Helical images of the head from the foramen magnum to the  vertex were obtained without contrast.    FINDINGS: There is enlargement of the ventricular system and cortical  sulci consistent with atrophy. There is white matter low density in  both hemispheres consistent with small vessel changes. There are no  masses or ventricular shifts or extracerebral collections. The cranial  vault is intact. The paranasal sinuses are clear.      Impression    IMPRESSION: Atrophy. No acute brain abnormality.      ZOHRA FONTANA MD         SYSTEM ID:  [id number]   UA reflex to Microscopic   Result Value Ref Range    Color Urine Yellow Colorless, Straw, Light Yellow, Yellow    Appearance Urine Clear Clear    Glucose Urine Negative Negative mg/dL    Bilirubin Urine Negative Negative    Ketones Urine 10  (A) Negative mg/dL    Specific Gravity Urine 1.024 1.000 - 1.030    Blood Urine Small  (A) Negative    pH Urine 6.0 5.0 - 9.0    Protein Albumin Urine 20  (A) Negative mg/dL    Urobilinogen Urine 2.0 Normal, 2.0 mg/dL    Nitrite Urine Negative Negative    Leukocyte Esterase Urine Negative Negative    RBC Urine 13 (H) <=2 /HPF    WBC Urine 4 <=5 /HPF    Mucus Urine Present (A) None Seen /LPF    Hyaline Casts Urine 3 (H) <=2 /LPF    Granular Casts Urine 3 (H) None Seen /LPF   XR Chest Port 1 View    Narrative    PROCEDURE:  XR CHEST PORT 1 VIEW    HISTORY:  fatigue.     COMPARISON:  April 16, 2021    FINDINGS:   There are bilateral pleural effusions or pleural thickening unchanged  from April 16, 2021. There is some compressive atelectasis at the left  lung base. Mild interstitial thickening is seen in both lungs. There  is mild cardiomegaly.      Impression    IMPRESSION:  No change from April 16, 2021      ZOHRA FONTANA MD         SYSTEM ID:  [id number]   Nt probnp inpatient (BNP)   Result Value Ref Range    N terminal Pro BNP Inpatient 228 (H) 0 - 100 pg/mL   Asymptomatic COVID-19 Virus (Coronavirus) by PCR Nasopharyngeal    Specimen: Nasopharyngeal; Swab    Narrative    The following orders were created for panel order Asymptomatic COVID-19 Virus (Coronavirus) by PCR Nasopharyngeal.  Procedure                               Abnormality         Status                     ---------                               -----------         ------                     SARS-COV2 (COVID-19) Vir...[[id number]]  Normal              Final result                 Please view results for these tests on the individual orders.   SARS-COV2 (COVID-19) Virus RT-PCR    Specimen: Nasopharyngeal; Swab   Result Value Ref Range    SARS CoV2 PCR Negative Negative    Narrative    Testing was performed using the Xpert Xpress SARS-CoV-2 Assay on the   Cepheid Gene-Xpert Instrument Systems. Additional information about   this Emergency Use Authorization (EUA) assay can be found via the Lab   Guide. This test should be ordered for the  detection of SARS-CoV-2 in   individuals who meet SARS-CoV-2 clinical and/or epidemiological   criteria. Test performance is unknown in asymptomatic patients. This   test is for in vitro diagnostic use under the FDA EUA for   laboratories certified under CLIA to perform high complexity testing.   This test has not been FDA cleared or approved. A negative result   does not rule out the presence of PCR inhibitors in the specimen or   target RNA in concentration below the limit of detection for the   assay. The possibility of a false negative should be considered if   the patient's recent exposure or clinical presentation suggests   COVID-19. This test was validated by [facility] and [facility]. This laboratory is certified under the Clinic  al Laboratory Improvement Amendments (CLIA) as qualified to perform high complex  ity clinical laboratory testing.       Medications   sodium chloride 0.9% infusion (0 mLs Intravenous Stopped 7/16/21 2005)   magnesium oxide (MAG-OX) tablet 800 mg (800 mg Oral Given 7/16/21 1913)   cefTRIAXone in d5w (ROCEPHIN) intermittent infusion 1 g (0 g Intravenous Stopped 7/16/21 2005)       Assessments & Plan (with Medical Decision Making)     I have reviewed the nursing notes.    I have reviewed the findings, diagnosis, plan and need for follow up with the patient.      Discharge Medication List as of 7/16/2021  8:05 PM      START taking these medications    Details   cefdinir (OMNICEF) 300 MG capsule Take 1 capsule (300 mg) by mouth 2 times daily for 5 days, Disp-10 capsule, R-0, Local Print             Final diagnoses:   Pneumonitis   Dehydration   Confusion associated with infection     Afebrile.  Vital signs stable but blood pressure seems to be slightly elevated.  Patient with a weeklong history of gradually decreasing appetite, fatigue weakness and his  reports increased confusion.  He is worried about dehydration.  IV established and the  patient was given fluids.  EKG shows sinus rhythm with sinus arrhythmia and first-degree AV block.  Cannot rule out inferior infarct, age undetermined.  Anterior septal infarct, age undetermined heart rate is 82.  Although in lead V1 this appears to be more of atrial fibrillation as I can see the P waves clearly.  This would then be unchanged from previous EKG on April 16, 2021.  Troponin is normal.  CRP is normal.  BNP is slightly elevated at 228 but this is only slightly elevated from 10 months ago when it was 216.  CBC shows elevated white blood cells at 13.8 with left shift.  Blood cultures are pending.  Her lactic acid returns the high end of normal at 2.0.  Procalcitonin is elevated as well at 2.37.   CMP is unremarkable.  Her magnesium returned slightly decreased at 1.8 and she was given oral replacement.  The patient's UA returns with no signs of significant UTI or hematuria.  Given her 's reports of confusion a CT of her head was performed that shows no acute findings which is reassuring.   Chest x-ray shows There are bilateral pleural effusions or pleural thickening unchanged from April 16, 2021. There is some compressive atelectasis at the left lung base. Mild interstitial thickening is seen in both lungs. There is mild cardiomegaly.  However given the patient's sats which are ranging from 91 to 95%, elevated white blood cells, elevated lactate and procalcitonin most likely some early pneumonitis.  She was given Rocephin IV.  Initially the patient's spouse was feeling the patient would benefit from hospitalization.  I did discuss his case with , hospitalist at [facility] who felt if this patient was going to be transported should also be on Zosyn.  I went to discuss this with the patient and his spouse and he reports she is doing much better and he would prefer to bring her home.  This seems reasonable.    Reviewed this with Dr. Edouard who feels this is acceptable.   Rx for Omnicef.   Return if there is any concerns for further evaluation as needed.  7/16/2021   [facility]     Venkata Wick PA-C  07/16/21 9724

## 2021-07-16 NOTE — ED TRIAGE NOTES
"ED Nursing Triage Note (General)   ________________________________    Anne Matos is a 89 year old Female that presents to triage via private vehicle with complaints of increased confusion.   states he is concerned that patient is becoming dehydrated as \"she does drink very much.  I tried to give her Gatorade but she wont drink very much.  I tried to give her her pills today and they just sat in her mouth, that usually doesn't happen\".  Patient has a hx of dementia.   states patient has decreased oral intake over the past couple weeks, however, it has gotten worse the past few days.   Significant symptoms had onset 2 weeks ago.  GCS-15  Airway:intact  Breathing noted as Normal  Action taken: 3      PRE HOSPITAL PRIOR LIVING SITUATION-home  "

## 2021-07-20 NOTE — RESULT ENCOUNTER NOTE
"Final urine culture report shows \"NO GROWTH\" and is NEGATIVE.  Recommendations in treatment per [facility] ED Lab result Urine culture protocol.    "

## 2021-07-23 NOTE — TELEPHONE ENCOUNTER
Patient was in the ER on 7/16/2021 and diagnosis with the start of pneumonia and dehydration    She has been doing OK all week.    Yesterday  started noticing confusion around 5pm.    She couldn't remember how to take her pills, she couldn't seem to answer his questions    Her answers to questions didn't make sense    Her breathing is the same    She health seems OK    No fever    No weakness    No TIA or CVA symptoms    She does sleep a lot - but yesterday she was wandering more    Advised that she be seen today as her behavior was not normal for her    Evelina Kenney RN  [place] Nurse Advisor  8:03 AM  7/23/2021    COVID 19 Nurse Triage Plan/Patient Instructions    Please be aware that novel coronavirus (COVID-19) may be circulating in the community. If you develop symptoms such as fever, cough, or SOB or if you have concerns about the presence of another infection including coronavirus (COVID-19), please contact your health care provider or visit [email or web address].     Disposition/Instructions    In-Person Visit with provider recommended. Reference Visit Selection Guide.    Thank you for taking steps to prevent the spread of this virus.  o Limit your contact with others.  o Wear a simple mask to cover your cough.  o Wash your hands well and often.    Resources    [facility]: About COVID-19: [email or web address]    CDC: What to Do If You're Sick: www.cdc.gov/coronavirus/2019-ncov/about/steps-when-sick.html    CDC: Ending Home Isolation: www.cdc.gov/coronavirus/2019-ncov/hcp/disposition-in-home-patients.html     CDC: Caring for Someone: www.cdc.gov/coronavirus/2019-ncov/if-you-are-sick/care-for-someone.html     [facility]: Interim Guidance for Hospital Discharge to Home: www.health.[facility].mn.us/diseases/coronavirus/hcp/hospdischarge.pdf    [facility] clinical trials (COVID-19 research studies): [email or web address]     Below are the COVID-19  hotlines at the [place] Department of Health ([facility]). Interpreters are available.   o For health questions: Call 794-041-4136 or 1-492.338.9695 (7 a.m. to 7 p.m.)  o For questions about schools and childcare: Call 030-941-5786 or 1-853.230.5098 (7 a.m. to 7 p.m.)                         Reason for Disposition    Patient wants to be seen (or caregiver requests)    Additional Information    Negative: Difficult to awaken or acting confused (disoriented, slurred speech) and has diabetes    Negative: Difficult to awaken or acting confused (disoriented, slurred speech) and new onset    Negative: Weakness of the face, arm, or leg on one side of the body and new onset    Negative: Numbness of the face, arm, or leg on one side of the body and new onset    Negative: Loss of speech or garbled speech and new onset    Negative: Difficulty breathing and bluish (or gray) lips or face    Negative: Shock suspected (e.g., cold/pale/clammy skin, too weak to stand, low BP, rapid pulse)    Negative: Seeing or hearing or feeling things that are not there (i.e., auditory, visual, or tactile hallucinations)    Negative: Followed a head injury    Negative: Drug overdose suspected    Negative: Sounds like a life-threatening emergency to the triager    Negative: Headache or vomiting    Negative: Stiff neck (can't touch chin to chest)    Negative: Very strange or paranoid behavior    Negative: Fever > 100.4 F (38.0 C)    Negative: Patient sounds very sick or weak to the triager    Negative: Brief confusion (now gone)    Protocols used: CONFUSION - DELIRIUM-A-OH

## 2021-07-23 NOTE — NURSING NOTE
Patient presents to clinic with more confusion and weakness that started last night.   Evelina Mayorga LPN

## 2021-07-23 NOTE — PROGRESS NOTES
Subjective   Anne Matos is a 89 year old female who presents for possible stroke.  She is here with her  today.  The patient has severe dementia and is a poor historian.  Last Friday he thought she was a little more confused.  He brought her to the emergency department.  She underwent a thorough evaluation.  They thought she had a slight pneumonia and prescribed antibiotics.  She was a little better until yesterday when things changed.  He thought her speech was more garbled.  Her memory was a little worse.  He gave her an aspirin.  No facial drooping.  No focal weakness of arms or legs.  No dysphagia.  No fever.  No vomiting.  She drinks one beer a day.  He does not think she could have gotten into any medicine or alcohol.  A couple of vitamins over-the-counter, no other significant over-the-counter medications including no CBD products.    Objective   Vitals: /84   Pulse 65   Temp 98  F (36.7  C) (Tympanic)   Resp 15   Wt 54.9 kg (121 lb)   SpO2 98%   BMI 22.13 kg/m      HEENT: No carotid bruits  Neuro:  strength 5/5 bilaterally.  Flexion and extension at elbows is 5/5 bilaterally.  Palate and uvula elevate symmetrically.  Alert, oriented to self  Cardiovascular: Irregular.  Grade 4/6 systolic murmur  Pulmonary: Clear    Review and Analysis of Data   I personally reviewed the following:  External notes: No  Results: Yes I reviewed the lab and imaging reports from the ER visit  Use of an independent historian: Yes I spoke with her   Independent review of a test performed by another physician: No  Discussion of management with another physician: No  Moderate risk of morbidity from additional diagnostic testing and/or treatment.    Assessment & Plan   1. Delirium  2. Severe dementia (H)  3. Heart murmur  4. Aortic valve sclerosis  5. Failure to thrive in adult    She appears to be having some waxing and waning delirium on top of severe baseline dementia.  Her pro calcitonin was  elevated, with possible small lower lobe pneumonia on x-ray.  She did have some improvement with antibiotics.  Urine testing was unremarkable.  We discussed the possibility of stroke.  I think it is less likely given the diffuse global symptoms and no focal symptoms.  We discussed the possibility of MRI and decided against it.  I am concerned about the possibility of her aortic sclerosis having progressed to severe aortic stenosis over the course of the last 9 years.  This certainly could be causing some altered mental status as well as generalized weakness throughout the body.  It would be reasonable to consider hospice in this 89-year-old woman with severe dementia and breast cancer and suspected severe aortic stenosis.  Her  plans to consider that.  I recommend close follow-up with Dr. Villagran.    Signed, Larry Mejia MD, FAAP, FACP  Internal Medicine & Pediatrics

## 2021-09-14 NOTE — TELEPHONE ENCOUNTER
"[id number] ([facility]) of [place] sent Rx request for the following:      Requested Prescriptions   Pending Prescriptions Disp Refills     potassium chloride ER (K-TAB/KLOR-CON) 10 MEQ CR tablet [Pharmacy Med Name: POTASSIUM CL 10MEQ ER TABLET] 180 tablet 2     Sig: TAKE 1 TABLET (10 MEQ) BY MOUTH 2 TIMES DAILY       Potassium Supplements Protocol Failed - 9/11/2021  1:44 PM        Failed - Medication is active on med list        Passed - Recent (12 mo) or future (30 days) visit within the authorizing provider's department     Patient has had an office visit with the authorizing provider or a provider within the authorizing providers department within the previous 12 mos or has a future within next 30 days. See \"Patient Info\" tab in inbasket, or \"Choose Columns\" in Meds & Orders section of the refill encounter.          Passed - Patient is age 18 or older        Passed - Normal serum potassium in past 12 months     Recent Labs   Lab Test 07/16/21  1706   POTASSIUM 3.8            Last Prescription Date:   9/2/2020 -6/4/2021  Last Fill Qty/Refills:         180, R-3    Last Office Visit:              6/4/2021   Future Office visit:           None noted  Stop Potassium and Torsemide for now.              per Adam Villagran MD  Unable to complete prescription refill per RN Medication Refill Policy.................... Lyssa Stinson RN ....................  9/14/2021   11:24 AM      "

## 2021-11-10 NOTE — TELEPHONE ENCOUNTER
Noted: Hospice referral placed 10/28/2021  Rx refill declined.      Electronically signed by:  Adam Villagran MD  on October 28, 2021 3:50 PM      Note from pharmacy on this date: Per pharmacy: Spouse want to continue to give given her worsening health.      Routing for review and consideration. Unable to complete prescription refill per RN Medication Refill Policy.................... Rachel Hagen RN ....................  11/10/2021   12:53 PM

## 2021-11-12 NOTE — PROGRESS NOTES
Clinic Care Coordination Contact  [facility]/Voicemail     Clinical Data: Care Coordinator Outreach  Outreach attempted x 2.   No voicemail set up or option for message.    Plan: Care Coordinator Will send letter.   Veronica Pickard RN on 11/12/2021 at 10:42 AM

## 2021-11-12 NOTE — LETTER
Anne Matos  [address] [place] [address]        Date: November 12, 2021    Dear Gabriel,    I am an RN care coordinator who works with Dr. Adam Villagran at [facility]. I have been trying to reach you to introduce care coordination services, but the phone numbers we have are not working at this time.     The clinic care coordinator is a registered nurse who understands the health care system. The goal of clinic care coordination is to help you manage navigate the health care system. For example, I can assist you in meeting your health care goals by providing information and referrals to resources, coordinating services, and strengthening communication with your providers.  It was mentioned that you may be looking for the support of hospice services, and I could certainly help with that.     Please feel free to contact me at [phone] with any questions or concerns.  I would be happy to help!     Sincerely,       Shanta ROSARIO RN  [facility] Care Coordinator

## 2021-11-17 PROBLEM — R53.81 DECLINING FUNCTIONAL STATUS: Status: ACTIVE | Noted: 2021-01-01

## 2021-11-22 NOTE — TELEPHONE ENCOUNTER
Received refill request form [facility] for Potassium CL 10 meq ER take one tablet by mouth 2 times daily.    Janina Marcum RN on 11/22/2021 at 10:13 AM

## 2021-11-23 NOTE — PROGRESS NOTES
Clinic Care Coordination Contact    Clinic Care Coordination Contact  OUTREACH    Referral Information:  PCP     Chief Complaint   Patient presents with     Clinic Care Coordination - Initial        Universal Utilization: See below     Utilization    Hospital Admissions  0             ED Visits  2             No Show Count (past year)  0                Current as of: 11/23/2021  2:34 AM            Clinical Concerns:  Current Medical Concerns:  Declining functional status, left breast cancer, Vascular dementia, CHF, osteoporosis, sore on left hip.     Current Behavioral Concerns: dementia without behavioral disturbance      Education Provided to patient: Care coordination discussed. [facility] services, home care and hospice services all reviewed.      Medication Management:   manages her medications. Medications will be reconciled by home care nurse, once admitted.     Functional Status:  Sleeps a lot.  helps her with activities of daily life including feeding, toiletting, dressing and bathing.      Living Situation:  Lives with  who cares for her 24/7. No children close by. , tSan, says he believes their daughter may also Alzheimer's starting and has started avoiding visiting patient because it is hard to see. Patient had one son die of Alzheimer's already.      Lifestyle & Psychosocial Needs:  She is very dependent on her . Does not appear to have anxiety, pain or sundowning per .     Social Determinants of Health     Tobacco Use: Medium Risk     Smoking Tobacco Use: Former Smoker     Smokeless Tobacco Use: Never Used   Alcohol Use: Not on file   Financial Resource Strain: Not on file   Food Insecurity: Not on file   Transportation Needs: Not on file   Physical Activity: Not on file   Stress: Not on file   Social Connections: Not on file   Intimate Partner Violence: Not on file   Depression: Not at risk     PHQ-2 Score: 0   Housing Stability: Not on file     Patient's  , Stan, called after receiving letter from care coordinator.  He has gotten a new phone with voicemail set up now.  Neither I or home care had been able to get through to him when previously referred.     He inquires about getting a home care nurse. Patient has declined so much he really doesn't worry about safety concerns. She can not walk or do anything on her own. Sleeping most of the time. Favors sleeping on the left side and has a new wound on left hip that  noticed bleeding yesterday when changing her briefs. Is concerned she may have a bed sore now. No behaviors, pain or distress observed. Still eating small amounts with his assistance. Her appetite approved when she went off of the cancer medications.     Resources and Interventions:  Current Resources:     Stan, is caregiver    Referral for home care nurseRISHABH.  (potential for PT)    Yvonne Ribera, .    [employer], dementia program has mailed resources and caregiver support group information to Stan.        Goals: To have more support with patient living at home as she declines.       Plan: CC will connect Stan with above resources.  He agreed he will call anytime he is feeling overwhelmed, needs information or help.   Veronica Pickard RN on 11/23/2021 at 3:24 PM

## 2021-11-23 NOTE — TELEPHONE ENCOUNTER
This prescription has been completed, however, if she takes it BID, should it be sent in for #180?       potassium chloride ER (KLOR-CON M) 10 MEQ CR tablet 90 tablet 0 11/23/2021  --   Sig - Route: Take 1 tablet (10 mEq) by mouth 2 times daily - Oral   Sent to pharmacy as: Potassium Chloride Kalee ER 10 MEQ Oral Tablet Extended Release (KLOR-CON M)   Class: E-Prescribe   Order: [id number]   E-Prescribing Status: Receipt confirmed by pharmacy (11/23/2021 12:59 PM CST)     Pharmacy    [id number] ([facility]) - [place], [place] - [address]

## 2021-12-02 NOTE — TELEPHONE ENCOUNTER
Okay for video visit for home care face-to-face appointment.    Okay to use same-day appointment spot next week.      Please verify with patient's  if this will be done on a smart phone or iPad or on the computer.    Adam Villagran MD

## 2021-12-02 NOTE — TELEPHONE ENCOUNTER
FYI:  Candie Gustafson called and will be looking into services for the patient from [facility]/hospice.    requested services from them

## 2022-01-01 ENCOUNTER — MEDICAL CORRESPONDENCE (OUTPATIENT)
Dept: HEALTH INFORMATION MANAGEMENT | Facility: OTHER | Age: 87
End: 2022-01-01
Payer: COMMERCIAL

## 2024-01-07 NOTE — TELEPHONE ENCOUNTER
Please call the patients .  They want a work in with the PCP  for a face to face to get home care.  Other openings, with other providers were offered.   Her  wants the PCP to do a video visit  For this too.  Please call today.      Evelina Ibarra on 12/2/2021 at 11:40 AM   present

## (undated) RX ORDER — MAGNESIUM OXIDE 400 MG/1
TABLET ORAL
Status: DISPENSED
Start: 2021-01-01

## (undated) RX ORDER — SODIUM CHLORIDE 9 MG/ML
INJECTION, SOLUTION INTRAVENOUS
Status: DISPENSED
Start: 2020-09-01

## (undated) RX ORDER — CEFTRIAXONE SODIUM 1 G/50ML
INJECTION, SOLUTION INTRAVENOUS
Status: DISPENSED
Start: 2020-09-01

## (undated) RX ORDER — SODIUM CHLORIDE 9 MG/ML
INJECTION, SOLUTION INTRAVENOUS
Status: DISPENSED
Start: 2021-01-01

## (undated) RX ORDER — CEFTRIAXONE SODIUM 1 G/50ML
INJECTION, SOLUTION INTRAVENOUS
Status: DISPENSED
Start: 2021-01-01

## (undated) RX ORDER — LIDOCAINE HYDROCHLORIDE AND EPINEPHRINE 10; 10 MG/ML; UG/ML
INJECTION, SOLUTION INFILTRATION; PERINEURAL
Status: DISPENSED
Start: 2019-09-03

## (undated) RX ORDER — LIDOCAINE HYDROCHLORIDE 10 MG/ML
INJECTION, SOLUTION INFILTRATION; PERINEURAL
Status: DISPENSED
Start: 2019-09-03